# Patient Record
Sex: MALE | Race: WHITE | Employment: FULL TIME | URBAN - METROPOLITAN AREA
[De-identification: names, ages, dates, MRNs, and addresses within clinical notes are randomized per-mention and may not be internally consistent; named-entity substitution may affect disease eponyms.]

---

## 2017-03-24 PROBLEM — M25.512 PAIN IN LEFT ACROMIOCLAVICULAR JOINT: Status: ACTIVE | Noted: 2017-03-24

## 2017-03-24 PROBLEM — S42.292A OTHER CLOSED DISPLACED FRACTURE OF PROXIMAL END OF LEFT HUMERUS, INITIAL ENCOUNTER: Status: ACTIVE | Noted: 2017-03-24

## 2017-03-24 PROBLEM — S42.252A CLOSED DISPLACED FRACTURE OF GREATER TUBEROSITY OF LEFT HUMERUS, INITIAL ENCOUNTER: Status: ACTIVE | Noted: 2017-03-24

## 2017-06-16 PROBLEM — M75.02 ADHESIVE CAPSULITIS OF LEFT SHOULDER: Status: ACTIVE | Noted: 2017-06-16

## 2017-06-16 PROBLEM — S42.292S OTHER CLOSED DISPLACED FRACTURE OF PROXIMAL END OF LEFT HUMERUS, SEQUELA: Status: ACTIVE | Noted: 2017-06-16

## 2019-06-14 PROCEDURE — 87086 URINE CULTURE/COLONY COUNT: CPT | Performed by: PHYSICIAN ASSISTANT

## 2020-02-01 ENCOUNTER — HOSPITAL ENCOUNTER (EMERGENCY)
Age: 40
Discharge: HOME | End: 2020-02-01
Attending: EMERGENCY MEDICINE
Payer: COMMERCIAL

## 2020-02-01 VITALS
SYSTOLIC BLOOD PRESSURE: 173 MMHG | HEART RATE: 98 BPM | WEIGHT: 315 LBS | RESPIRATION RATE: 16 BRPM | BODY MASS INDEX: 49 KG/M2 | OXYGEN SATURATION: 99 % | TEMPERATURE: 98 F | DIASTOLIC BLOOD PRESSURE: 91 MMHG

## 2020-02-01 DIAGNOSIS — M54.16 LUMBAR RADICULOPATHY: Primary | ICD-10-CM

## 2020-02-01 PROCEDURE — 96372 THER/PROPH/DIAG INJ SC/IM: CPT

## 2020-02-01 PROCEDURE — 99283 EMERGENCY DEPT VISIT LOW MDM: CPT

## 2020-02-01 RX ORDER — HYDROCODONE BITARTRATE AND ACETAMINOPHEN 5; 325 MG/1; MG/1
1 TABLET ORAL EVERY 6 HOURS PRN
Qty: 6 TABLET | Refills: 0 | Status: SHIPPED | OUTPATIENT
Start: 2020-02-01 | End: 2021-04-13 | Stop reason: ALTCHOICE

## 2020-02-01 RX ORDER — PREDNISONE 20 MG/1
60 TABLET ORAL ONCE
Status: COMPLETED | OUTPATIENT
Start: 2020-02-01 | End: 2020-02-01

## 2020-02-01 RX ORDER — KETOROLAC TROMETHAMINE 30 MG/ML
60 INJECTION, SOLUTION INTRAMUSCULAR; INTRAVENOUS ONCE
Status: COMPLETED | OUTPATIENT
Start: 2020-02-01 | End: 2020-02-01

## 2020-02-01 RX ORDER — METHYLPREDNISOLONE 4 MG/1
TABLET ORAL
Qty: 1 PACKAGE | Refills: 0 | Status: SHIPPED | OUTPATIENT
Start: 2020-02-01 | End: 2020-02-07 | Stop reason: ALTCHOICE

## 2020-02-01 NOTE — ED PROVIDER NOTES
Patient Seen in: THE [facility] Emergency Department In [place]      History   Patient presents with:  Back Pain    Stated Complaint: back pain     HPI    This is a 36-year-old male with past medical history of morbid obesity, gout, hypertension, ADHD who pre Drug use: No             Review of Systems    Positive for stated complaint: back pain   Other systems are as noted in HPI. Constitutional and vital signs reviewed. All other systems reviewed and negative except as noted above.     Physical Exam     E visit. Follow-up:  spine    On 2/6/2020          Medications Prescribed:  Current Discharge Medication List    START taking these medications    HYDROcodone-acetaminophen 5-325 MG Oral Tab  Take 1 tablet by mouth every 6 (six) hours as needed for Pain.

## 2021-11-16 ENCOUNTER — APPOINTMENT (OUTPATIENT)
Dept: CT IMAGING | Age: 41
End: 2021-11-16
Attending: EMERGENCY MEDICINE
Payer: COMMERCIAL

## 2021-11-16 ENCOUNTER — HOSPITAL ENCOUNTER (EMERGENCY)
Age: 41
Discharge: HOME | End: 2021-11-16
Attending: EMERGENCY MEDICINE
Payer: COMMERCIAL

## 2021-11-16 VITALS
BODY MASS INDEX: 42.66 KG/M2 | HEIGHT: 72 IN | RESPIRATION RATE: 22 BRPM | HEART RATE: 82 BPM | OXYGEN SATURATION: 97 % | SYSTOLIC BLOOD PRESSURE: 151 MMHG | WEIGHT: 315 LBS | DIASTOLIC BLOOD PRESSURE: 68 MMHG

## 2021-11-16 DIAGNOSIS — N23 RENAL COLIC: Primary | ICD-10-CM

## 2021-11-16 PROCEDURE — 96374 THER/PROPH/DIAG INJ IV PUSH: CPT

## 2021-11-16 PROCEDURE — 99284 EMERGENCY DEPT VISIT MOD MDM: CPT

## 2021-11-16 PROCEDURE — 74176 CT ABD & PELVIS W/O CONTRAST: CPT | Performed by: EMERGENCY MEDICINE

## 2021-11-16 PROCEDURE — 81001 URINALYSIS AUTO W/SCOPE: CPT | Performed by: EMERGENCY MEDICINE

## 2021-11-16 PROCEDURE — 85025 COMPLETE CBC W/AUTO DIFF WBC: CPT | Performed by: EMERGENCY MEDICINE

## 2021-11-16 PROCEDURE — 96376 TX/PRO/DX INJ SAME DRUG ADON: CPT

## 2021-11-16 PROCEDURE — 96375 TX/PRO/DX INJ NEW DRUG ADDON: CPT

## 2021-11-16 PROCEDURE — 80053 COMPREHEN METABOLIC PANEL: CPT | Performed by: EMERGENCY MEDICINE

## 2021-11-16 PROCEDURE — 96361 HYDRATE IV INFUSION ADD-ON: CPT

## 2021-11-16 RX ORDER — TAMSULOSIN HYDROCHLORIDE 0.4 MG/1
0.4 CAPSULE ORAL DAILY
Qty: 7 CAPSULE | Refills: 0 | Status: SHIPPED | OUTPATIENT
Start: 2021-11-16 | End: 2021-11-16

## 2021-11-16 RX ORDER — TAMSULOSIN HYDROCHLORIDE 0.4 MG/1
0.4 CAPSULE ORAL DAILY
Qty: 7 CAPSULE | Refills: 0 | Status: SHIPPED | OUTPATIENT
Start: 2021-11-16 | End: 2021-11-23

## 2021-11-16 RX ORDER — ONDANSETRON 2 MG/ML
4 INJECTION INTRAMUSCULAR; INTRAVENOUS
Status: DISCONTINUED | OUTPATIENT
Start: 2021-11-16 | End: 2021-11-17

## 2021-11-16 RX ORDER — ONDANSETRON 8 MG/1
8 TABLET, ORALLY DISINTEGRATING ORAL EVERY 6 HOURS PRN
Qty: 10 TABLET | Refills: 0 | Status: SHIPPED | OUTPATIENT
Start: 2021-11-16 | End: 2021-11-16

## 2021-11-16 RX ORDER — KETOROLAC TROMETHAMINE 30 MG/ML
30 INJECTION, SOLUTION INTRAMUSCULAR; INTRAVENOUS ONCE
Status: COMPLETED | OUTPATIENT
Start: 2021-11-16 | End: 2021-11-16

## 2021-11-16 RX ORDER — ONDANSETRON 8 MG/1
8 TABLET, ORALLY DISINTEGRATING ORAL EVERY 6 HOURS PRN
Qty: 10 TABLET | Refills: 0 | Status: SHIPPED | OUTPATIENT
Start: 2021-11-16

## 2021-11-16 RX ORDER — HYDROCODONE BITARTRATE AND ACETAMINOPHEN 5; 325 MG/1; MG/1
1-2 TABLET ORAL EVERY 6 HOURS PRN
Qty: 20 TABLET | Refills: 0 | Status: SHIPPED | OUTPATIENT
Start: 2021-11-16 | End: 2021-11-16

## 2021-11-16 RX ORDER — HYDROCODONE BITARTRATE AND ACETAMINOPHEN 5; 325 MG/1; MG/1
1-2 TABLET ORAL EVERY 6 HOURS PRN
Qty: 20 TABLET | Refills: 0 | Status: SHIPPED | OUTPATIENT
Start: 2021-11-16 | End: 2021-11-26

## 2021-11-16 RX ORDER — HYDROMORPHONE HYDROCHLORIDE 1 MG/ML
0.5 INJECTION, SOLUTION INTRAMUSCULAR; INTRAVENOUS; SUBCUTANEOUS EVERY 30 MIN PRN
Status: DISCONTINUED | OUTPATIENT
Start: 2021-11-16 | End: 2021-11-17

## 2021-11-16 RX ORDER — HYDROMORPHONE HYDROCHLORIDE 1 MG/ML
INJECTION, SOLUTION INTRAMUSCULAR; INTRAVENOUS; SUBCUTANEOUS
Status: COMPLETED
Start: 2021-11-16 | End: 2021-11-16

## 2021-11-17 NOTE — ED INITIAL ASSESSMENT (HPI)
Pt to ed from home with l side abd pain, pt sx present last few hrs, pain radiates to groin area, denies injury or trauma

## 2021-11-17 NOTE — ED PROVIDER NOTES
Patient Seen in: [facility] Emergency Department In [place]      History   Patient presents with:  Abdomen/Flank Pain  Groin Pain    Stated Complaint: Left Flank pain with vomiting, left groin pain     Subjective:   HPI    Patient complains of left-sided with his eyes closed appears in great discomfort. Eyes: sclera white, conjunctiva pink and moist.  Lids and lashes are normal.  Lungs: Clear to auscultation bilaterally. No rhonchi or rales. Heart: Normal S1 and S2, without murmur or rub.   Distal pulses -----------         ------                     CBC W/ DIFFERENTIAL[[id number]]          Abnormal            Final result                 Please view results for these tests on the individual orders.                    MDM Dispersible  Take 1 tablet (8 mg total) by mouth every 6 (six) hours as needed for Nausea.   Qty: 10 tablet Refills: 0

## 2022-07-07 ENCOUNTER — OFFICE VISIT (OUTPATIENT)
Dept: INTERNAL MEDICINE CLINIC | Facility: CLINIC | Age: 42
End: 2022-07-07
Payer: COMMERCIAL

## 2022-07-07 VITALS
DIASTOLIC BLOOD PRESSURE: 72 MMHG | SYSTOLIC BLOOD PRESSURE: 124 MMHG | HEART RATE: 98 BPM | WEIGHT: 315 LBS | OXYGEN SATURATION: 97 % | HEIGHT: 73 IN | BODY MASS INDEX: 41.75 KG/M2 | RESPIRATION RATE: 18 BRPM | TEMPERATURE: 97 F

## 2022-07-07 DIAGNOSIS — R73.01 IFG (IMPAIRED FASTING GLUCOSE): ICD-10-CM

## 2022-07-07 DIAGNOSIS — I10 ESSENTIAL HYPERTENSION: ICD-10-CM

## 2022-07-07 DIAGNOSIS — M10.9 GOUTY ARTHROPATHY: ICD-10-CM

## 2022-07-07 DIAGNOSIS — F90.9 ATTENTION DEFICIT HYPERACTIVITY DISORDER (ADHD), UNSPECIFIED ADHD TYPE: ICD-10-CM

## 2022-07-07 DIAGNOSIS — Z00.00 ROUTINE GENERAL MEDICAL EXAMINATION AT A HEALTH CARE FACILITY: Primary | ICD-10-CM

## 2022-07-07 DIAGNOSIS — E66.01 CLASS 3 SEVERE OBESITY DUE TO EXCESS CALORIES WITHOUT SERIOUS COMORBIDITY WITH BODY MASS INDEX (BMI) OF 45.0 TO 49.9 IN ADULT (HCC): ICD-10-CM

## 2022-07-07 PROCEDURE — 3008F BODY MASS INDEX DOCD: CPT | Performed by: PHYSICIAN ASSISTANT

## 2022-07-07 PROCEDURE — 3074F SYST BP LT 130 MM HG: CPT | Performed by: PHYSICIAN ASSISTANT

## 2022-07-07 PROCEDURE — 99386 PREV VISIT NEW AGE 40-64: CPT | Performed by: PHYSICIAN ASSISTANT

## 2022-07-07 PROCEDURE — 3078F DIAST BP <80 MM HG: CPT | Performed by: PHYSICIAN ASSISTANT

## 2022-07-07 RX ORDER — DEXTROAMPHETAMINE SACCHARATE, AMPHETAMINE ASPARTATE, DEXTROAMPHETAMINE SULFATE AND AMPHETAMINE SULFATE 5; 5; 5; 5 MG/1; MG/1; MG/1; MG/1
20 TABLET ORAL 2 TIMES DAILY
Qty: 60 TABLET | Refills: 0 | Status: SHIPPED | OUTPATIENT
Start: 2022-09-07 | End: 2022-10-07

## 2022-07-07 RX ORDER — ALLOPURINOL 100 MG/1
100 TABLET ORAL 2 TIMES DAILY
Qty: 180 TABLET | Refills: 3 | Status: SHIPPED | OUTPATIENT
Start: 2022-07-07

## 2022-07-07 RX ORDER — HYDROCHLOROTHIAZIDE 25 MG/1
25 TABLET ORAL DAILY
Qty: 90 TABLET | Refills: 3 | Status: SHIPPED | OUTPATIENT
Start: 2022-07-07 | End: 2023-07-02

## 2022-07-07 RX ORDER — LOSARTAN POTASSIUM 100 MG/1
100 TABLET ORAL DAILY
Qty: 90 TABLET | Refills: 3 | Status: SHIPPED | OUTPATIENT
Start: 2022-07-07

## 2022-07-07 RX ORDER — DEXTROAMPHETAMINE SACCHARATE, AMPHETAMINE ASPARTATE, DEXTROAMPHETAMINE SULFATE AND AMPHETAMINE SULFATE 5; 5; 5; 5 MG/1; MG/1; MG/1; MG/1
20 TABLET ORAL 2 TIMES DAILY
Qty: 60 TABLET | Refills: 0 | Status: SHIPPED | OUTPATIENT
Start: 2022-08-07 | End: 2022-09-06

## 2022-07-07 RX ORDER — DEXTROAMPHETAMINE SACCHARATE, AMPHETAMINE ASPARTATE, DEXTROAMPHETAMINE SULFATE AND AMPHETAMINE SULFATE 5; 5; 5; 5 MG/1; MG/1; MG/1; MG/1
20 TABLET ORAL 2 TIMES DAILY
Qty: 60 TABLET | Refills: 0 | Status: SHIPPED | OUTPATIENT
Start: 2022-07-07 | End: 2022-08-06

## 2022-07-22 ENCOUNTER — TELEPHONE (OUTPATIENT)
Dept: INTERNAL MEDICINE CLINIC | Facility: CLINIC | Age: 42
End: 2022-07-22

## 2022-07-22 NOTE — TELEPHONE ENCOUNTER
PA submitted to [facility], awaiting response, form placed in CS folder

## 2022-07-28 ENCOUNTER — LAB ENCOUNTER (OUTPATIENT)
Dept: LAB | Age: 42
End: 2022-07-28
Attending: PHYSICIAN ASSISTANT
Payer: COMMERCIAL

## 2022-07-28 LAB
ALBUMIN SERPL-MCNC: 3.8 G/DL (ref 3.4–5)
ALBUMIN/GLOB SERPL: 1 {RATIO} (ref 1–2)
ALP LIVER SERPL-CCNC: 85 U/L
ALT SERPL-CCNC: 63 U/L
ANION GAP SERPL CALC-SCNC: 7 MMOL/L (ref 0–18)
AST SERPL-CCNC: 32 U/L (ref 15–37)
BASOPHILS # BLD AUTO: 0.04 X10(3) UL (ref 0–0.2)
BASOPHILS NFR BLD AUTO: 0.4 %
BILIRUB SERPL-MCNC: 0.9 MG/DL (ref 0.1–2)
BUN BLD-MCNC: 11 MG/DL (ref 7–18)
CALCIUM BLD-MCNC: 9 MG/DL (ref 8.5–10.1)
CHLORIDE SERPL-SCNC: 106 MMOL/L (ref 98–112)
CHOLEST SERPL-MCNC: 160 MG/DL (ref ?–200)
CO2 SERPL-SCNC: 27 MMOL/L (ref 21–32)
CREAT BLD-MCNC: 1.1 MG/DL
EOSINOPHIL # BLD AUTO: 0.1 X10(3) UL (ref 0–0.7)
EOSINOPHIL NFR BLD AUTO: 1.1 %
ERYTHROCYTE [DISTWIDTH] IN BLOOD BY AUTOMATED COUNT: 12.8 %
EST. AVERAGE GLUCOSE BLD GHB EST-MCNC: 131 MG/DL (ref 68–126)
FASTING PATIENT LIPID ANSWER: YES
FASTING STATUS PATIENT QL REPORTED: YES
GLOBULIN PLAS-MCNC: 3.9 G/DL (ref 2.8–4.4)
GLUCOSE BLD-MCNC: 131 MG/DL (ref 70–99)
HBA1C MFR BLD: 6.2 % (ref ?–5.7)
HCT VFR BLD AUTO: 47.5 %
HDLC SERPL-MCNC: 42 MG/DL (ref 40–59)
HGB BLD-MCNC: 15.9 G/DL
IMM GRANULOCYTES # BLD AUTO: 0.02 X10(3) UL (ref 0–1)
IMM GRANULOCYTES NFR BLD: 0.2 %
LDLC SERPL CALC-MCNC: 100 MG/DL (ref ?–100)
LYMPHOCYTES # BLD AUTO: 2.46 X10(3) UL (ref 1–4)
LYMPHOCYTES NFR BLD AUTO: 26.4 %
MCH RBC QN AUTO: 30.8 PG (ref 26–34)
MCHC RBC AUTO-ENTMCNC: 33.5 G/DL (ref 31–37)
MCV RBC AUTO: 91.9 FL
MONOCYTES # BLD AUTO: 0.82 X10(3) UL (ref 0.1–1)
MONOCYTES NFR BLD AUTO: 8.8 %
NEUTROPHILS # BLD AUTO: 5.89 X10 (3) UL (ref 1.5–7.7)
NEUTROPHILS # BLD AUTO: 5.89 X10(3) UL (ref 1.5–7.7)
NEUTROPHILS NFR BLD AUTO: 63.1 %
NONHDLC SERPL-MCNC: 118 MG/DL (ref ?–130)
OSMOLALITY SERPL CALC.SUM OF ELEC: 291 MOSM/KG (ref 275–295)
PLATELET # BLD AUTO: 195 10(3)UL (ref 150–450)
POTASSIUM SERPL-SCNC: 4 MMOL/L (ref 3.5–5.1)
PROT SERPL-MCNC: 7.7 G/DL (ref 6.4–8.2)
RBC # BLD AUTO: 5.17 X10(6)UL
SODIUM SERPL-SCNC: 140 MMOL/L (ref 136–145)
TRIGL SERPL-MCNC: 100 MG/DL (ref 30–149)
TSI SER-ACNC: 2.23 MIU/ML (ref 0.36–3.74)
VLDLC SERPL CALC-MCNC: 17 MG/DL (ref 0–30)
WBC # BLD AUTO: 9.3 X10(3) UL (ref 4–11)

## 2022-08-02 DIAGNOSIS — R79.89 ELEVATED LFTS: ICD-10-CM

## 2022-08-02 DIAGNOSIS — R73.03 PREDIABETES: Primary | ICD-10-CM

## 2022-08-25 ENCOUNTER — OFFICE VISIT (OUTPATIENT)
Dept: INTERNAL MEDICINE CLINIC | Facility: CLINIC | Age: 42
End: 2022-08-25
Payer: COMMERCIAL

## 2022-08-25 VITALS
HEIGHT: 73 IN | HEART RATE: 88 BPM | WEIGHT: 315 LBS | SYSTOLIC BLOOD PRESSURE: 142 MMHG | DIASTOLIC BLOOD PRESSURE: 78 MMHG | TEMPERATURE: 97 F | RESPIRATION RATE: 18 BRPM | BODY MASS INDEX: 41.75 KG/M2

## 2022-08-25 DIAGNOSIS — N50.812 PAIN IN BOTH TESTICLES: Primary | ICD-10-CM

## 2022-08-25 DIAGNOSIS — M54.41 ACUTE RIGHT-SIDED LOW BACK PAIN WITH RIGHT-SIDED SCIATICA: ICD-10-CM

## 2022-08-25 DIAGNOSIS — N50.811 PAIN IN BOTH TESTICLES: Primary | ICD-10-CM

## 2022-08-25 DIAGNOSIS — I10 ESSENTIAL HYPERTENSION: ICD-10-CM

## 2022-08-25 LAB
APPEARANCE: YELLOW
BILIRUBIN: NEGATIVE
GLUCOSE (URINE DIPSTICK): NEGATIVE MG/DL
KETONES (URINE DIPSTICK): NEGATIVE MG/DL
LEUKOCYTES: NEGATIVE
MULTISTIX LOT#: NORMAL NUMERIC
NITRITE, URINE: NEGATIVE
OCCULT BLOOD: NEGATIVE
PH, URINE: 6 (ref 4.5–8)
PROTEIN (URINE DIPSTICK): NEGATIVE MG/DL
SPECIFIC GRAVITY: 1.02 (ref 1–1.03)
URINE-COLOR: CLEAR
UROBILINOGEN,SEMI-QN: 1 MG/DL (ref 0–1.9)

## 2022-08-25 PROCEDURE — 3077F SYST BP >= 140 MM HG: CPT | Performed by: PHYSICIAN ASSISTANT

## 2022-08-25 PROCEDURE — 81003 URINALYSIS AUTO W/O SCOPE: CPT | Performed by: PHYSICIAN ASSISTANT

## 2022-08-25 PROCEDURE — 3008F BODY MASS INDEX DOCD: CPT | Performed by: PHYSICIAN ASSISTANT

## 2022-08-25 PROCEDURE — 99214 OFFICE O/P EST MOD 30 MIN: CPT | Performed by: PHYSICIAN ASSISTANT

## 2022-08-25 PROCEDURE — 3078F DIAST BP <80 MM HG: CPT | Performed by: PHYSICIAN ASSISTANT

## 2022-08-25 RX ORDER — METHYLPREDNISOLONE 4 MG/1
TABLET ORAL
Qty: 1 EACH | Refills: 0 | Status: SHIPPED | OUTPATIENT
Start: 2022-08-25

## 2022-08-25 RX ORDER — CYCLOBENZAPRINE HCL 10 MG
10 TABLET ORAL 3 TIMES DAILY PRN
Qty: 30 TABLET | Refills: 0 | Status: SHIPPED | OUTPATIENT
Start: 2022-08-25

## 2022-12-14 ENCOUNTER — APPOINTMENT (OUTPATIENT)
Dept: GENERAL RADIOLOGY | Age: 42
End: 2022-12-14
Attending: EMERGENCY MEDICINE
Payer: COMMERCIAL

## 2022-12-14 ENCOUNTER — HOSPITAL ENCOUNTER (EMERGENCY)
Age: 42
Discharge: HOME | End: 2022-12-14
Attending: EMERGENCY MEDICINE
Payer: COMMERCIAL

## 2022-12-14 VITALS
OXYGEN SATURATION: 98 % | DIASTOLIC BLOOD PRESSURE: 71 MMHG | WEIGHT: 315 LBS | TEMPERATURE: 98 F | RESPIRATION RATE: 20 BRPM | SYSTOLIC BLOOD PRESSURE: 128 MMHG | BODY MASS INDEX: 49 KG/M2 | HEART RATE: 92 BPM

## 2022-12-14 DIAGNOSIS — R00.2 PALPITATIONS: Primary | ICD-10-CM

## 2022-12-14 LAB
ALBUMIN SERPL-MCNC: 3.9 G/DL (ref 3.4–5)
ALBUMIN/GLOB SERPL: 1 {RATIO} (ref 1–2)
ALP LIVER SERPL-CCNC: 86 U/L
ALT SERPL-CCNC: 73 U/L
ANION GAP SERPL CALC-SCNC: 4 MMOL/L (ref 0–18)
AST SERPL-CCNC: 36 U/L (ref 15–37)
BASOPHILS # BLD AUTO: 0.03 X10(3) UL (ref 0–0.2)
BASOPHILS NFR BLD AUTO: 0.5 %
BILIRUB SERPL-MCNC: 0.5 MG/DL (ref 0.1–2)
BUN BLD-MCNC: 16 MG/DL (ref 7–18)
CALCIUM BLD-MCNC: 9.2 MG/DL (ref 8.5–10.1)
CHLORIDE SERPL-SCNC: 101 MMOL/L (ref 98–112)
CO2 SERPL-SCNC: 32 MMOL/L (ref 21–32)
CREAT BLD-MCNC: 1.11 MG/DL
D DIMER PPP FEU-MCNC: <0.27 UG/ML FEU (ref ?–0.5)
EOSINOPHIL # BLD AUTO: 0.07 X10(3) UL (ref 0–0.7)
EOSINOPHIL NFR BLD AUTO: 1.2 %
ERYTHROCYTE [DISTWIDTH] IN BLOOD BY AUTOMATED COUNT: 12.2 %
GFR SERPLBLD BASED ON 1.73 SQ M-ARVRAT: 86 ML/MIN/1.73M2 (ref 60–?)
GLOBULIN PLAS-MCNC: 4 G/DL (ref 2.8–4.4)
GLUCOSE BLD-MCNC: 91 MG/DL (ref 70–99)
HCT VFR BLD AUTO: 45.9 %
HGB BLD-MCNC: 15.8 G/DL
IMM GRANULOCYTES # BLD AUTO: 0.01 X10(3) UL (ref 0–1)
IMM GRANULOCYTES NFR BLD: 0.2 %
LYMPHOCYTES # BLD AUTO: 1.93 X10(3) UL (ref 1–4)
LYMPHOCYTES NFR BLD AUTO: 32.7 %
MAGNESIUM SERPL-MCNC: 2 MG/DL (ref 1.6–2.6)
MCH RBC QN AUTO: 31.1 PG (ref 26–34)
MCHC RBC AUTO-ENTMCNC: 34.4 G/DL (ref 31–37)
MCV RBC AUTO: 90.4 FL
MONOCYTES # BLD AUTO: 1.4 X10(3) UL (ref 0.1–1)
MONOCYTES NFR BLD AUTO: 23.7 %
NEUTROPHILS # BLD AUTO: 2.46 X10 (3) UL (ref 1.5–7.7)
NEUTROPHILS # BLD AUTO: 2.46 X10(3) UL (ref 1.5–7.7)
NEUTROPHILS NFR BLD AUTO: 41.7 %
OSMOLALITY SERPL CALC.SUM OF ELEC: 285 MOSM/KG (ref 275–295)
PLATELET # BLD AUTO: 185 10(3)UL (ref 150–450)
POTASSIUM SERPL-SCNC: 3.5 MMOL/L (ref 3.5–5.1)
PROT SERPL-MCNC: 7.9 G/DL (ref 6.4–8.2)
RBC # BLD AUTO: 5.08 X10(6)UL
SODIUM SERPL-SCNC: 137 MMOL/L (ref 136–145)
TROPONIN I HIGH SENSITIVITY: 5 NG/L
TSI SER-ACNC: 1.9 MIU/ML (ref 0.36–3.74)
WBC # BLD AUTO: 5.9 X10(3) UL (ref 4–11)

## 2022-12-14 PROCEDURE — 85379 FIBRIN DEGRADATION QUANT: CPT | Performed by: EMERGENCY MEDICINE

## 2022-12-14 PROCEDURE — 93005 ELECTROCARDIOGRAM TRACING: CPT

## 2022-12-14 PROCEDURE — 71045 X-RAY EXAM CHEST 1 VIEW: CPT | Performed by: EMERGENCY MEDICINE

## 2022-12-14 PROCEDURE — 85025 COMPLETE CBC W/AUTO DIFF WBC: CPT | Performed by: EMERGENCY MEDICINE

## 2022-12-14 PROCEDURE — 80053 COMPREHEN METABOLIC PANEL: CPT | Performed by: EMERGENCY MEDICINE

## 2022-12-14 PROCEDURE — 93010 ELECTROCARDIOGRAM REPORT: CPT

## 2022-12-14 PROCEDURE — 84443 ASSAY THYROID STIM HORMONE: CPT | Performed by: EMERGENCY MEDICINE

## 2022-12-14 PROCEDURE — 99285 EMERGENCY DEPT VISIT HI MDM: CPT

## 2022-12-14 PROCEDURE — 83735 ASSAY OF MAGNESIUM: CPT | Performed by: EMERGENCY MEDICINE

## 2022-12-14 PROCEDURE — 36415 COLL VENOUS BLD VENIPUNCTURE: CPT

## 2022-12-14 PROCEDURE — 99284 EMERGENCY DEPT VISIT MOD MDM: CPT

## 2022-12-14 PROCEDURE — 84484 ASSAY OF TROPONIN QUANT: CPT | Performed by: EMERGENCY MEDICINE

## 2022-12-15 LAB
ATRIAL RATE: 103 BPM
P AXIS: 53 DEGREES
P-R INTERVAL: 140 MS
Q-T INTERVAL: 342 MS
QRS DURATION: 76 MS
QTC CALCULATION (BEZET): 448 MS
R AXIS: 7 DEGREES
T AXIS: 45 DEGREES
VENTRICULAR RATE: 103 BPM

## 2022-12-15 NOTE — DISCHARGE INSTRUCTIONS
Minimize or eliminate caffeine, nicotine, alcohol, over-the-counter decongestants and antihistamines. Schedule outpatient Holter monitor. Return to the emergency department for new or worsening symptoms.

## 2023-01-04 ENCOUNTER — HOSPITAL ENCOUNTER (OUTPATIENT)
Dept: CV DIAGNOSTICS | Age: 43
Discharge: HOME | End: 2023-01-04
Attending: EMERGENCY MEDICINE
Payer: COMMERCIAL

## 2023-01-04 DIAGNOSIS — R00.2 PALPITATIONS: ICD-10-CM

## 2023-01-04 PROCEDURE — 93225 XTRNL ECG REC<48 HRS REC: CPT | Performed by: EMERGENCY MEDICINE

## 2023-01-04 PROCEDURE — 93227 XTRNL ECG REC<48 HR R&I: CPT | Performed by: EMERGENCY MEDICINE

## 2023-01-11 ENCOUNTER — HOSPITAL ENCOUNTER (OUTPATIENT)
Dept: ULTRASOUND IMAGING | Age: 43
Discharge: HOME | End: 2023-01-11
Attending: PHYSICIAN ASSISTANT
Payer: COMMERCIAL

## 2023-01-11 DIAGNOSIS — R79.89 ELEVATED LFTS: ICD-10-CM

## 2023-01-11 DIAGNOSIS — N50.811 PAIN IN BOTH TESTICLES: Primary | ICD-10-CM

## 2023-01-11 DIAGNOSIS — N50.811 PAIN IN BOTH TESTICLES: ICD-10-CM

## 2023-01-11 DIAGNOSIS — N50.812 PAIN IN BOTH TESTICLES: ICD-10-CM

## 2023-01-11 DIAGNOSIS — N50.812 PAIN IN BOTH TESTICLES: Primary | ICD-10-CM

## 2023-01-11 PROCEDURE — 93975 VASCULAR STUDY: CPT | Performed by: PHYSICIAN ASSISTANT

## 2023-01-11 PROCEDURE — 76700 US EXAM ABDOM COMPLETE: CPT | Performed by: PHYSICIAN ASSISTANT

## 2023-01-11 PROCEDURE — 76870 US EXAM SCROTUM: CPT | Performed by: PHYSICIAN ASSISTANT

## 2023-03-09 ENCOUNTER — TELEMEDICINE (OUTPATIENT)
Dept: INTERNAL MEDICINE CLINIC | Facility: CLINIC | Age: 43
End: 2023-03-09
Payer: COMMERCIAL

## 2023-03-09 DIAGNOSIS — R19.7 DIARRHEA, UNSPECIFIED TYPE: Primary | ICD-10-CM

## 2023-03-09 RX ORDER — DEXTROAMPHETAMINE SACCHARATE, AMPHETAMINE ASPARTATE, DEXTROAMPHETAMINE SULFATE AND AMPHETAMINE SULFATE 5; 5; 5; 5 MG/1; MG/1; MG/1; MG/1
20 TABLET ORAL 2 TIMES DAILY
Qty: 60 TABLET | Refills: 0 | Status: SHIPPED | OUTPATIENT
Start: 2023-03-09

## 2023-03-09 RX ORDER — ALLOPURINOL 100 MG/1
100 TABLET ORAL 2 TIMES DAILY
Qty: 60 TABLET | Refills: 0 | Status: SHIPPED | OUTPATIENT
Start: 2023-03-09

## 2023-03-09 RX ORDER — LOSARTAN POTASSIUM 100 MG/1
100 TABLET ORAL DAILY
Qty: 30 TABLET | Refills: 0 | Status: SHIPPED | OUTPATIENT
Start: 2023-03-09

## 2023-03-09 RX ORDER — HYDROCHLOROTHIAZIDE 25 MG/1
25 TABLET ORAL DAILY
Qty: 30 TABLET | Refills: 0 | Status: SHIPPED | OUTPATIENT
Start: 2023-03-09 | End: 2024-03-03

## 2023-03-10 ENCOUNTER — LAB ENCOUNTER (OUTPATIENT)
Dept: LAB | Age: 43
End: 2023-03-10
Attending: PHYSICIAN ASSISTANT
Payer: COMMERCIAL

## 2023-03-10 DIAGNOSIS — R19.7 DIARRHEA, UNSPECIFIED TYPE: ICD-10-CM

## 2023-03-10 PROCEDURE — 87427 SHIGA-LIKE TOXIN AG IA: CPT

## 2023-03-10 PROCEDURE — 87046 STOOL CULTR AEROBIC BACT EA: CPT

## 2023-03-10 PROCEDURE — 87493 C DIFF AMPLIFIED PROBE: CPT

## 2023-03-10 PROCEDURE — 87045 FECES CULTURE AEROBIC BACT: CPT

## 2023-03-11 LAB — C DIFF TOX B STL QL: NEGATIVE

## 2023-10-16 ENCOUNTER — TELEPHONE (OUTPATIENT)
Dept: INTERNAL MEDICINE CLINIC | Facility: CLINIC | Age: 43
End: 2023-10-16

## 2023-10-16 DIAGNOSIS — Z13.29 SCREENING FOR THYROID DISORDER: ICD-10-CM

## 2023-10-16 DIAGNOSIS — Z13.220 SCREENING FOR LIPID DISORDERS: ICD-10-CM

## 2023-10-16 DIAGNOSIS — Z13.228 SCREENING FOR METABOLIC DISORDER: ICD-10-CM

## 2023-10-16 DIAGNOSIS — Z00.00 ROUTINE GENERAL MEDICAL EXAMINATION AT A HEALTH CARE FACILITY: Primary | ICD-10-CM

## 2023-10-16 DIAGNOSIS — Z13.0 SCREENING FOR DISORDER OF BLOOD AND BLOOD-FORMING ORGANS: ICD-10-CM

## 2023-10-16 NOTE — TELEPHONE ENCOUNTER
Future Appointments   Date Time Provider Aroldo Vianey   11/16/2023 10:00 AM Lora Tai PA-C [facility]     Orders to quest- Pt informed that labs need to be completed no sooner than 2 weeks prior to the appt.  Pt aware to fast-no call back required

## 2023-11-14 ENCOUNTER — LAB ENCOUNTER (OUTPATIENT)
Dept: LAB | Age: 43
End: 2023-11-14
Attending: PHYSICIAN ASSISTANT
Payer: COMMERCIAL

## 2023-11-14 LAB
ALBUMIN SERPL-MCNC: 3.5 G/DL (ref 3.4–5)
ALBUMIN/GLOB SERPL: 1 {RATIO} (ref 1–2)
ALP LIVER SERPL-CCNC: 90 U/L
ALT SERPL-CCNC: 56 U/L
ANION GAP SERPL CALC-SCNC: 4 MMOL/L (ref 0–18)
AST SERPL-CCNC: 23 U/L (ref 15–37)
BASOPHILS # BLD AUTO: 0.04 X10(3) UL (ref 0–0.2)
BASOPHILS NFR BLD AUTO: 0.6 %
BILIRUB SERPL-MCNC: 0.5 MG/DL (ref 0.1–2)
BUN BLD-MCNC: 15 MG/DL (ref 9–23)
CALCIUM BLD-MCNC: 9.1 MG/DL (ref 8.5–10.1)
CHLORIDE SERPL-SCNC: 103 MMOL/L (ref 98–112)
CHOLEST SERPL-MCNC: 148 MG/DL (ref ?–200)
CO2 SERPL-SCNC: 31 MMOL/L (ref 21–32)
CREAT BLD-MCNC: 1.08 MG/DL
EGFRCR SERPLBLD CKD-EPI 2021: 88 ML/MIN/1.73M2 (ref 60–?)
EOSINOPHIL # BLD AUTO: 0.18 X10(3) UL (ref 0–0.7)
EOSINOPHIL NFR BLD AUTO: 2.6 %
ERYTHROCYTE [DISTWIDTH] IN BLOOD BY AUTOMATED COUNT: 12.5 %
FASTING PATIENT LIPID ANSWER: YES
FASTING STATUS PATIENT QL REPORTED: YES
GLOBULIN PLAS-MCNC: 3.6 G/DL (ref 2.8–4.4)
GLUCOSE BLD-MCNC: 192 MG/DL (ref 70–99)
HCT VFR BLD AUTO: 44.7 %
HDLC SERPL-MCNC: 36 MG/DL (ref 40–59)
HGB BLD-MCNC: 14.9 G/DL
IMM GRANULOCYTES # BLD AUTO: 0.01 X10(3) UL (ref 0–1)
IMM GRANULOCYTES NFR BLD: 0.1 %
LDLC SERPL CALC-MCNC: 81 MG/DL (ref ?–100)
LYMPHOCYTES # BLD AUTO: 2.64 X10(3) UL (ref 1–4)
LYMPHOCYTES NFR BLD AUTO: 38.2 %
MCH RBC QN AUTO: 31.4 PG (ref 26–34)
MCHC RBC AUTO-ENTMCNC: 33.3 G/DL (ref 31–37)
MCV RBC AUTO: 94.1 FL
MONOCYTES # BLD AUTO: 0.89 X10(3) UL (ref 0.1–1)
MONOCYTES NFR BLD AUTO: 12.9 %
NEUTROPHILS # BLD AUTO: 3.16 X10 (3) UL (ref 1.5–7.7)
NEUTROPHILS # BLD AUTO: 3.16 X10(3) UL (ref 1.5–7.7)
NEUTROPHILS NFR BLD AUTO: 45.6 %
NONHDLC SERPL-MCNC: 112 MG/DL (ref ?–130)
OSMOLALITY SERPL CALC.SUM OF ELEC: 292 MOSM/KG (ref 275–295)
PLATELET # BLD AUTO: 181 10(3)UL (ref 150–450)
POTASSIUM SERPL-SCNC: 4.1 MMOL/L (ref 3.5–5.1)
PROT SERPL-MCNC: 7.1 G/DL (ref 6.4–8.2)
RBC # BLD AUTO: 4.75 X10(6)UL
SODIUM SERPL-SCNC: 138 MMOL/L (ref 136–145)
T4 FREE SERPL-MCNC: 1.1 NG/DL (ref 0.8–1.7)
TRIGL SERPL-MCNC: 180 MG/DL (ref 30–149)
TSI SER-ACNC: 4.24 MIU/ML (ref 0.36–3.74)
VLDLC SERPL CALC-MCNC: 28 MG/DL (ref 0–30)
WBC # BLD AUTO: 6.9 X10(3) UL (ref 4–11)

## 2023-11-14 PROCEDURE — 3051F HG A1C>EQUAL 7.0%<8.0%: CPT | Performed by: PHYSICIAN ASSISTANT

## 2023-11-16 ENCOUNTER — OFFICE VISIT (OUTPATIENT)
Dept: INTERNAL MEDICINE CLINIC | Facility: CLINIC | Age: 43
End: 2023-11-16
Payer: COMMERCIAL

## 2023-11-16 ENCOUNTER — HOSPITAL ENCOUNTER (OUTPATIENT)
Dept: GENERAL RADIOLOGY | Age: 43
Discharge: HOME | End: 2023-11-16
Attending: PHYSICIAN ASSISTANT
Payer: COMMERCIAL

## 2023-11-16 VITALS
HEART RATE: 111 BPM | HEIGHT: 73 IN | TEMPERATURE: 98 F | SYSTOLIC BLOOD PRESSURE: 136 MMHG | DIASTOLIC BLOOD PRESSURE: 80 MMHG | WEIGHT: 315 LBS | RESPIRATION RATE: 18 BRPM | OXYGEN SATURATION: 98 % | BODY MASS INDEX: 41.75 KG/M2

## 2023-11-16 DIAGNOSIS — K21.9 GASTROESOPHAGEAL REFLUX DISEASE, UNSPECIFIED WHETHER ESOPHAGITIS PRESENT: ICD-10-CM

## 2023-11-16 DIAGNOSIS — N50.82 SCROTAL PAIN: ICD-10-CM

## 2023-11-16 DIAGNOSIS — R05.3 CHRONIC COUGH: ICD-10-CM

## 2023-11-16 DIAGNOSIS — E11.9 TYPE 2 DIABETES MELLITUS WITHOUT COMPLICATION, WITHOUT LONG-TERM CURRENT USE OF INSULIN (HCC): ICD-10-CM

## 2023-11-16 DIAGNOSIS — R04.0 EPISTAXIS: ICD-10-CM

## 2023-11-16 DIAGNOSIS — Z00.00 ROUTINE GENERAL MEDICAL EXAMINATION AT A HEALTH CARE FACILITY: Primary | ICD-10-CM

## 2023-11-16 DIAGNOSIS — E66.01 CLASS 3 SEVERE OBESITY DUE TO EXCESS CALORIES WITHOUT SERIOUS COMORBIDITY WITH BODY MASS INDEX (BMI) OF 50.0 TO 59.9 IN ADULT (HCC): ICD-10-CM

## 2023-11-16 DIAGNOSIS — M79.672 LEFT FOOT PAIN: ICD-10-CM

## 2023-11-16 DIAGNOSIS — R10.9 BILATERAL FLANK PAIN: ICD-10-CM

## 2023-11-16 DIAGNOSIS — R79.89 ELEVATED TSH: ICD-10-CM

## 2023-11-16 DIAGNOSIS — F90.9 ATTENTION DEFICIT HYPERACTIVITY DISORDER (ADHD), UNSPECIFIED ADHD TYPE: ICD-10-CM

## 2023-11-16 DIAGNOSIS — G47.33 OSA (OBSTRUCTIVE SLEEP APNEA): ICD-10-CM

## 2023-11-16 DIAGNOSIS — I10 ESSENTIAL HYPERTENSION: ICD-10-CM

## 2023-11-16 LAB
APPEARANCE: CLEAR
BILIRUBIN: NEGATIVE
GLUCOSE (URINE DIPSTICK): NEGATIVE MG/DL
KETONES (URINE DIPSTICK): NEGATIVE MG/DL
LEUKOCYTES: NEGATIVE
MULTISTIX EXPIRATION DATE: ABNORMAL DATE
MULTISTIX LOT#: ABNORMAL NUMERIC
NITRITE, URINE: NEGATIVE
OCCULT BLOOD: NEGATIVE
PH, URINE: 6 (ref 4.5–8)
PROTEIN (URINE DIPSTICK): 100 MG/DL
SPECIFIC GRAVITY: 1.03 (ref 1–1.03)
URINE-COLOR: YELLOW
UROBILINOGEN,SEMI-QN: 0.2 MG/DL (ref 0–1.9)

## 2023-11-16 PROCEDURE — 92229 IMG RTA DETC/MNTR DS POC ALY: CPT | Performed by: PHYSICIAN ASSISTANT

## 2023-11-16 PROCEDURE — 71046 X-RAY EXAM CHEST 2 VIEWS: CPT | Performed by: PHYSICIAN ASSISTANT

## 2023-11-16 PROCEDURE — 2033F EYE IMG VALID W/O RTNOPTHY: CPT | Performed by: PHYSICIAN ASSISTANT

## 2023-11-16 PROCEDURE — 73630 X-RAY EXAM OF FOOT: CPT | Performed by: PHYSICIAN ASSISTANT

## 2023-11-16 PROCEDURE — 81003 URINALYSIS AUTO W/O SCOPE: CPT | Performed by: PHYSICIAN ASSISTANT

## 2023-11-16 PROCEDURE — 99396 PREV VISIT EST AGE 40-64: CPT | Performed by: PHYSICIAN ASSISTANT

## 2023-11-16 PROCEDURE — 99214 OFFICE O/P EST MOD 30 MIN: CPT | Performed by: PHYSICIAN ASSISTANT

## 2023-11-16 PROCEDURE — 3075F SYST BP GE 130 - 139MM HG: CPT | Performed by: PHYSICIAN ASSISTANT

## 2023-11-16 PROCEDURE — 3079F DIAST BP 80-89 MM HG: CPT | Performed by: PHYSICIAN ASSISTANT

## 2023-11-16 PROCEDURE — 3008F BODY MASS INDEX DOCD: CPT | Performed by: PHYSICIAN ASSISTANT

## 2023-11-16 RX ORDER — SEMAGLUTIDE 0.68 MG/ML
0.25 INJECTION, SOLUTION SUBCUTANEOUS WEEKLY
Qty: 1 EACH | Refills: 0 | Status: SHIPPED | OUTPATIENT
Start: 2023-11-16

## 2023-11-16 RX ORDER — DEXTROAMPHETAMINE SACCHARATE, AMPHETAMINE ASPARTATE, DEXTROAMPHETAMINE SULFATE AND AMPHETAMINE SULFATE 5; 5; 5; 5 MG/1; MG/1; MG/1; MG/1
20 TABLET ORAL 2 TIMES DAILY
Qty: 60 TABLET | Refills: 0 | Status: SHIPPED | OUTPATIENT
Start: 2023-11-16

## 2023-11-16 RX ORDER — TRIAMCINOLONE ACETONIDE 1 MG/G
CREAM TOPICAL 2 TIMES DAILY PRN
Qty: 30 G | Refills: 0 | Status: SHIPPED | OUTPATIENT
Start: 2023-11-16

## 2023-11-16 RX ORDER — PEN NEEDLE, DIABETIC 32GX 5/32"
NEEDLE, DISPOSABLE MISCELLANEOUS
Qty: 30 EACH | Refills: 3 | Status: SHIPPED | OUTPATIENT
Start: 2023-11-16

## 2023-11-16 RX ORDER — NICOTINE POLACRILEX 4 MG/1
1 GUM, CHEWING ORAL
Qty: 90 TABLET | Refills: 1 | Status: SHIPPED | OUTPATIENT
Start: 2023-11-16 | End: 2023-12-16

## 2023-11-16 RX ORDER — ALLOPURINOL 100 MG/1
100 TABLET ORAL 2 TIMES DAILY
Qty: 180 TABLET | Refills: 3 | Status: SHIPPED | OUTPATIENT
Start: 2023-11-16

## 2023-11-16 RX ORDER — CYCLOBENZAPRINE HCL 10 MG
10 TABLET ORAL 3 TIMES DAILY PRN
Qty: 30 TABLET | Refills: 0 | Status: CANCELLED | OUTPATIENT
Start: 2023-11-16

## 2023-11-16 RX ORDER — LOSARTAN POTASSIUM 100 MG/1
100 TABLET ORAL DAILY
Qty: 90 TABLET | Refills: 3 | Status: SHIPPED | OUTPATIENT
Start: 2023-11-16

## 2023-11-16 RX ORDER — HYDROCHLOROTHIAZIDE 25 MG/1
25 TABLET ORAL DAILY
Qty: 90 TABLET | Refills: 3 | Status: SHIPPED | OUTPATIENT
Start: 2023-11-16 | End: 2024-11-10

## 2023-11-17 LAB
CREATININE, RANDOM URINE: 199 MG/DL (ref 20–320)
MICROALBUMIN/CREATININE RATIO, RANDOM URINE: 80 MCG/MG CREAT
MICROALBUMIN: 16 MG/DL

## 2023-11-18 ENCOUNTER — PATIENT MESSAGE (OUTPATIENT)
Dept: INTERNAL MEDICINE CLINIC | Facility: CLINIC | Age: 43
End: 2023-11-18

## 2023-11-21 NOTE — TELEPHONE ENCOUNTER
Patient calling is having difficulty getting his Ozempic from the pharmacy. Connie only has the 1mg and wondering if patient can increase the dose? Pt will also need PA for insurance to cover. Patient asking for callback to let him know what CS is going to do.     If she does increase the dose to 1mg can someone call [facility] to cancel the .25 mg?    Yaneth Hawkins [facility], [address] AT [facility], [phone], [phone]

## 2023-11-22 RX ORDER — METFORMIN HYDROCHLORIDE 500 MG/1
500 TABLET, EXTENDED RELEASE ORAL 2 TIMES DAILY WITH MEALS
Qty: 180 TABLET | Refills: 1 | Status: SHIPPED | OUTPATIENT
Start: 2023-11-22

## 2023-11-22 NOTE — TELEPHONE ENCOUNTER
Cannot jump right to 1 mg. If not in stock, then would just recommend starting metformin instead. Med sent.

## 2023-12-04 RX ORDER — SEMAGLUTIDE 0.68 MG/ML
0.25 INJECTION, SOLUTION SUBCUTANEOUS WEEKLY
Qty: 1 EACH | Refills: 0 | Status: SHIPPED | OUTPATIENT
Start: 2023-12-04

## 2023-12-08 ENCOUNTER — TELEPHONE (OUTPATIENT)
Dept: INTERNAL MEDICINE CLINIC | Facility: CLINIC | Age: 43
End: 2023-12-08

## 2023-12-15 ENCOUNTER — OFFICE VISIT (OUTPATIENT)
Dept: INTERNAL MEDICINE CLINIC | Facility: CLINIC | Age: 43
End: 2023-12-15
Payer: COMMERCIAL

## 2023-12-15 VITALS
TEMPERATURE: 98 F | HEIGHT: 72.44 IN | SYSTOLIC BLOOD PRESSURE: 138 MMHG | HEART RATE: 88 BPM | WEIGHT: 315 LBS | RESPIRATION RATE: 18 BRPM | BODY MASS INDEX: 42.2 KG/M2 | DIASTOLIC BLOOD PRESSURE: 78 MMHG | OXYGEN SATURATION: 98 %

## 2023-12-15 DIAGNOSIS — I10 ESSENTIAL HYPERTENSION: Primary | ICD-10-CM

## 2023-12-15 DIAGNOSIS — E66.01 CLASS 3 SEVERE OBESITY DUE TO EXCESS CALORIES WITHOUT SERIOUS COMORBIDITY WITH BODY MASS INDEX (BMI) OF 50.0 TO 59.9 IN ADULT (HCC): ICD-10-CM

## 2023-12-15 DIAGNOSIS — E11.9 TYPE 2 DIABETES MELLITUS WITHOUT COMPLICATION, WITHOUT LONG-TERM CURRENT USE OF INSULIN (HCC): ICD-10-CM

## 2023-12-15 RX ORDER — METFORMIN HYDROCHLORIDE 500 MG/1
500 TABLET, EXTENDED RELEASE ORAL
Qty: 180 TABLET | Refills: 1 | Status: SHIPPED | OUTPATIENT
Start: 2023-12-15

## 2023-12-15 RX ORDER — TIRZEPATIDE 2.5 MG/.5ML
2.5 INJECTION, SOLUTION SUBCUTANEOUS WEEKLY
Qty: 2 ML | Refills: 1 | Status: SHIPPED | OUTPATIENT
Start: 2023-12-15

## 2023-12-18 RX ORDER — DEXTROAMPHETAMINE SACCHARATE, AMPHETAMINE ASPARTATE, DEXTROAMPHETAMINE SULFATE AND AMPHETAMINE SULFATE 5; 5; 5; 5 MG/1; MG/1; MG/1; MG/1
20 TABLET ORAL 2 TIMES DAILY
Qty: 60 TABLET | Refills: 0 | Status: SHIPPED | OUTPATIENT
Start: 2023-12-18

## 2023-12-18 NOTE — TELEPHONE ENCOUNTER
Last VISIT - 12/15/23 f/u for HTN     Last CPE - 11/16/23     Last REFILL -     amphetamine-dextroamphetamine (ADDERALL) 20 MG Oral Tab 60 tablet 0 11/16/2023     Last LABS - 11/14/23 cbc, cmp, lipid, and tsh 11/16/23 microalbumin    No future appointments. Per PROTOCOL? None     Please Approve or Deny.

## 2024-02-19 DIAGNOSIS — F90.9 ATTENTION DEFICIT HYPERACTIVITY DISORDER (ADHD), UNSPECIFIED ADHD TYPE: ICD-10-CM

## 2024-02-19 RX ORDER — DEXTROAMPHETAMINE SACCHARATE, AMPHETAMINE ASPARTATE, DEXTROAMPHETAMINE SULFATE AND AMPHETAMINE SULFATE 5; 5; 5; 5 MG/1; MG/1; MG/1; MG/1
20 TABLET ORAL 2 TIMES DAILY
Qty: 60 TABLET | Refills: 0 | Status: SHIPPED | OUTPATIENT
Start: 2024-02-19

## 2024-02-19 NOTE — TELEPHONE ENCOUNTER
Last VISIT:12/15/2023 BENEDICTO CERVANTES    Last CPE:11/16/2023 BENEDICTO CERVANTES    Last REFILL:01/18/2024   Medication Quantity Refills Start End   amphetamine-dextroamphetamine (ADDERALL) 20 MG Oral Tab 60 tablet 0         Last LABS:11/14/2023     Future Appointments   Date Time Provider Department Center   3/1/2024 11:20 AM Maria M Garcia PA-C [facility]         Per PROTOCOL? Non Protocol    Approve or Deny

## 2024-03-01 ENCOUNTER — OFFICE VISIT (OUTPATIENT)
Dept: INTERNAL MEDICINE CLINIC | Facility: CLINIC | Age: 44
End: 2024-03-01
Payer: COMMERCIAL

## 2024-03-01 VITALS
BODY MASS INDEX: 42.2 KG/M2 | HEART RATE: 84 BPM | WEIGHT: 315 LBS | HEIGHT: 72.44 IN | OXYGEN SATURATION: 98 % | SYSTOLIC BLOOD PRESSURE: 126 MMHG | RESPIRATION RATE: 18 BRPM | TEMPERATURE: 97 F | DIASTOLIC BLOOD PRESSURE: 78 MMHG

## 2024-03-01 DIAGNOSIS — F90.9 ATTENTION DEFICIT HYPERACTIVITY DISORDER (ADHD), UNSPECIFIED ADHD TYPE: ICD-10-CM

## 2024-03-01 DIAGNOSIS — E55.9 VITAMIN D DEFICIENCY: ICD-10-CM

## 2024-03-01 DIAGNOSIS — E66.01 CLASS 3 SEVERE OBESITY DUE TO EXCESS CALORIES WITHOUT SERIOUS COMORBIDITY WITH BODY MASS INDEX (BMI) OF 50.0 TO 59.9 IN ADULT (HCC): ICD-10-CM

## 2024-03-01 DIAGNOSIS — R53.83 OTHER FATIGUE: ICD-10-CM

## 2024-03-01 DIAGNOSIS — I10 ESSENTIAL HYPERTENSION: ICD-10-CM

## 2024-03-01 DIAGNOSIS — E11.9 TYPE 2 DIABETES MELLITUS WITHOUT COMPLICATION, WITHOUT LONG-TERM CURRENT USE OF INSULIN (HCC): Primary | ICD-10-CM

## 2024-03-01 DIAGNOSIS — G47.33 OSA (OBSTRUCTIVE SLEEP APNEA): ICD-10-CM

## 2024-03-01 PROCEDURE — 3078F DIAST BP <80 MM HG: CPT | Performed by: PHYSICIAN ASSISTANT

## 2024-03-01 PROCEDURE — 3074F SYST BP LT 130 MM HG: CPT | Performed by: PHYSICIAN ASSISTANT

## 2024-03-01 PROCEDURE — 3008F BODY MASS INDEX DOCD: CPT | Performed by: PHYSICIAN ASSISTANT

## 2024-03-01 PROCEDURE — 99214 OFFICE O/P EST MOD 30 MIN: CPT | Performed by: PHYSICIAN ASSISTANT

## 2024-03-01 PROCEDURE — 90677 PCV20 VACCINE IM: CPT | Performed by: PHYSICIAN ASSISTANT

## 2024-03-01 PROCEDURE — 90471 IMMUNIZATION ADMIN: CPT | Performed by: PHYSICIAN ASSISTANT

## 2024-03-01 RX ORDER — TIRZEPATIDE 5 MG/.5ML
5 INJECTION, SOLUTION SUBCUTANEOUS WEEKLY
Qty: 6 ML | Refills: 1 | Status: SHIPPED | OUTPATIENT
Start: 2024-03-01

## 2024-03-01 NOTE — PROGRESS NOTES
Eleazar Das is a 43 year old male.   Chief Complaint   Patient presents with    Diabetes     EJ RM 11- Pt is here for a diabetes f/u     HPI:    Pt presents for f/u.     DM. Taking mounjaro 2.5 mg weekly and tolerating this well. A1c down from 7.7 to 6.6%. Weight is down 20 lbs.    Still with fatigue. Chronic issue. He was diagnosed with ELVIE in 2021 but was unable to tolerate cpap so no current treatment. Also with h/o vit D def and elevated TSH.     Allergies:  No Known Allergies   Current Meds:  Current Outpatient Medications   Medication Sig Dispense Refill    Tirzepatide (MOUNJARO) 5 MG/0.5ML Subcutaneous Solution Pen-injector Inject 5 mg into the skin once a week. 6 mL 1    amphetamine-dextroamphetamine (ADDERALL) 20 MG Oral Tab Take 1 tablet (20 mg total) by mouth 2 (two) times daily. 60 tablet 0    allopurinol 100 MG Oral Tab Take 1 tablet (100 mg total) by mouth 2 (two) times daily. 180 tablet 3    hydroCHLOROthiazide 25 MG Oral Tab Take 1 tablet (25 mg total) by mouth daily. 90 tablet 3    losartan 100 MG Oral Tab Take 1 tablet (100 mg total) by mouth daily. 90 tablet 3    metFORMIN  MG Oral Tablet 24 Hr Take 1 tablet (500 mg total) by mouth daily with breakfast. (Patient not taking: Reported on 3/1/2024) 180 tablet 1    Insulin Pen Needle (BD PEN NEEDLE LIBRA U/F) 32G X 4 MM Does not apply Misc Use with ozempic once weekly (Patient not taking: Reported on 12/15/2023) 30 each 3    triamcinolone 0.1 % External Cream Apply topically 2 (two) times daily as needed. (Patient not taking: Reported on 3/1/2024) 30 g 0    methylPREDNISolone (MEDROL) 4 MG Oral Tablet Therapy Pack As directed. (Patient not taking: Reported on 12/15/2023) 1 each 0    cyclobenzaprine 10 MG Oral Tab Take 1 tablet (10 mg total) by mouth 3 (three) times daily as needed for Muscle spasms. (Patient not taking: Reported on 12/15/2023) 30 tablet 0        PMH:     Past Medical History:   Diagnosis Date    ADHD (attention deficit  hyperactivity disorder)     Back pain     Gout     Hypogonadotropic hypogonadism (HCC)     Obesity, unspecified     Obstructive apnea 05/19/2021    DMG HST AHI 11    Unspecified essential hypertension        ROS:   GENERAL: Negative for fever, chills and fatigue. NAD.  HENT: Negative for congestion, sore throat, and ear pain.  RESPIRATORY: Negative for cough, chest tightness, shortness of breath and wheezing.    CV: Negative for chest pain, palpitations and leg swelling.   GI: Negative for nausea, vomiting, abdominal pain, diarrhea, and blood in stool.   : Negative for dysuria, hematuria and difficulty urinating.   MUSCULOSKELETAL: Negative for myalgias, back pain, joint swelling, arthralgias and gait problem.   NEURO: Negative for dizziness, syncope, weakness, numbness, tingling and headaches.   PSYCH: The patient is not nervous/anxious. No depression.      PHYSICAL EXAM:    /78   Pulse 84   Temp 96.8 °F (36 °C) (Temporal)   Resp 18   Ht 6' 0.44\" (1.84 m)   Wt (!) 374 lb 12.8 oz (170 kg)   SpO2 98%   BMI 50.22 kg/m²     GENERAL: NAD. A&Ox3  RESPIRATORY: CTAB, no R/R/W  CV: RRR, no murmurs.   PSYCH: Appropriate mood and affect.      ASSESSMENT/ PLAN:   1. Type 2 diabetes mellitus without complication, without long-term current use of insulin ([facility])  Improved and now at goal   Increase mounjaro to 5 mg weekly   Recheck labs and ov in 3 months   - Comp Metabolic Panel (14) [E]    2. Essential hypertension  Well controlled  CPM   Continue working on weight loss     3. Class 3 severe obesity due to excess calories without serious comorbidity with body mass index (BMI) of 50.0 to 59.9 in adult ([facility])  Weight is down 20 lbs  Increase mounjaro to 5 mg weekly   F/u in 3 months     4. Attention deficit hyperactivity disorder (ADHD), unspecified ADHD type  Stable with adderall prn   CPM    5. Vitamin D deficiency  Recheck labs   - Vitamin D [E]; Future    6. ELVIE (obstructive sleep apnea)  F/u with pulm   -  Pulmonary Referral - In Network    7. Other fatigue  F/u with pulm regarding ELVIE  Recheck vit D   Recheck TSH   Continue to work on DM control        Health Maintenance Due   Topic Date Due    Pneumococcal Vaccine: Birth to 65yrs (1 of 2 - PCV) Never done    Annual Depression Screening  01/01/2024           Pt indicates understanding and agrees to the plan.     Return in about 3 months (around 6/1/2024) for diabetes follow up.    Maria M Garcia PA-C

## 2024-03-02 ENCOUNTER — LAB ENCOUNTER (OUTPATIENT)
Dept: LAB | Age: 44
End: 2024-03-02
Attending: PHYSICIAN ASSISTANT
Payer: COMMERCIAL

## 2024-03-02 DIAGNOSIS — E55.9 VITAMIN D DEFICIENCY: ICD-10-CM

## 2024-03-02 LAB
ALBUMIN SERPL-MCNC: 3.8 G/DL (ref 3.4–5)
ALBUMIN/GLOB SERPL: 1 {RATIO} (ref 1–2)
ALP LIVER SERPL-CCNC: 92 U/L
ALT SERPL-CCNC: 69 U/L
ANION GAP SERPL CALC-SCNC: 6 MMOL/L (ref 0–18)
AST SERPL-CCNC: 27 U/L (ref 15–37)
BILIRUB SERPL-MCNC: 0.6 MG/DL (ref 0.1–2)
BUN BLD-MCNC: 13 MG/DL (ref 9–23)
CALCIUM BLD-MCNC: 10.1 MG/DL (ref 8.5–10.1)
CHLORIDE SERPL-SCNC: 105 MMOL/L (ref 98–112)
CO2 SERPL-SCNC: 28 MMOL/L (ref 21–32)
CREAT BLD-MCNC: 1.08 MG/DL
EGFRCR SERPLBLD CKD-EPI 2021: 88 ML/MIN/1.73M2 (ref 60–?)
FASTING STATUS PATIENT QL REPORTED: YES
GLOBULIN PLAS-MCNC: 3.9 G/DL (ref 2.8–4.4)
GLUCOSE BLD-MCNC: 119 MG/DL (ref 70–99)
OSMOLALITY SERPL CALC.SUM OF ELEC: 289 MOSM/KG (ref 275–295)
POTASSIUM SERPL-SCNC: 3.7 MMOL/L (ref 3.5–5.1)
PROT SERPL-MCNC: 7.7 G/DL (ref 6.4–8.2)
SODIUM SERPL-SCNC: 139 MMOL/L (ref 136–145)
TSI SER-ACNC: 2.25 MIU/ML (ref 0.36–3.74)
VIT D+METAB SERPL-MCNC: 52 NG/ML (ref 30–100)

## 2024-03-02 PROCEDURE — 82306 VITAMIN D 25 HYDROXY: CPT

## 2024-03-21 DIAGNOSIS — F90.9 ATTENTION DEFICIT HYPERACTIVITY DISORDER (ADHD), UNSPECIFIED ADHD TYPE: ICD-10-CM

## 2024-03-21 NOTE — TELEPHONE ENCOUNTER
Last VISIT:03/01/2024 BENEDICTO CERVANTES    Last CPE:11/16/2023 BENEDICTO CERVANTES    Last REFILL:02/19/2024   Medication Quantity Refills Start End   amphetamine-dextroamphetamine (ADDERALL) 20 MG Oral Tab 60 tablet 0         Last LABS:03/02/2024    No future appointments.      Per PROTOCOL? Non Protocol    Please Approve or Deny.

## 2024-03-23 RX ORDER — DEXTROAMPHETAMINE SACCHARATE, AMPHETAMINE ASPARTATE, DEXTROAMPHETAMINE SULFATE AND AMPHETAMINE SULFATE 5; 5; 5; 5 MG/1; MG/1; MG/1; MG/1
20 TABLET ORAL 2 TIMES DAILY
Qty: 60 TABLET | Refills: 0 | Status: SHIPPED | OUTPATIENT
Start: 2024-03-23

## 2024-04-21 DIAGNOSIS — F90.9 ATTENTION DEFICIT HYPERACTIVITY DISORDER (ADHD), UNSPECIFIED ADHD TYPE: ICD-10-CM

## 2024-04-22 NOTE — TELEPHONE ENCOUNTER
Requested Prescriptions     Pending Prescriptions Disp Refills    amphetamine-dextroamphetamine (ADDERALL) 20 MG Oral Tab 60 tablet 0     Sig: Take 1 tablet (20 mg total) by mouth 2 (two) times daily.       LOV: 3-1-2024-cs-diabetes    LAST CPE: 11--cs-physical    Last Labs: 3-2-2024-cmp,tsh    Last Refill: 3--60 tabs with 0 refills

## 2024-04-23 RX ORDER — DEXTROAMPHETAMINE SACCHARATE, AMPHETAMINE ASPARTATE, DEXTROAMPHETAMINE SULFATE AND AMPHETAMINE SULFATE 5; 5; 5; 5 MG/1; MG/1; MG/1; MG/1
20 TABLET ORAL 2 TIMES DAILY
Qty: 60 TABLET | Refills: 0 | Status: SHIPPED | OUTPATIENT
Start: 2024-04-23

## 2024-05-21 DIAGNOSIS — F90.9 ATTENTION DEFICIT HYPERACTIVITY DISORDER (ADHD), UNSPECIFIED ADHD TYPE: ICD-10-CM

## 2024-05-24 RX ORDER — DEXTROAMPHETAMINE SACCHARATE, AMPHETAMINE ASPARTATE, DEXTROAMPHETAMINE SULFATE AND AMPHETAMINE SULFATE 5; 5; 5; 5 MG/1; MG/1; MG/1; MG/1
20 TABLET ORAL 2 TIMES DAILY
Qty: 60 TABLET | Refills: 0 | Status: CANCELLED | OUTPATIENT
Start: 2024-05-24

## 2024-05-24 RX ORDER — DEXTROAMPHETAMINE SACCHARATE, AMPHETAMINE ASPARTATE, DEXTROAMPHETAMINE SULFATE AND AMPHETAMINE SULFATE 5; 5; 5; 5 MG/1; MG/1; MG/1; MG/1
20 TABLET ORAL 2 TIMES DAILY
Qty: 60 TABLET | Refills: 0 | Status: SHIPPED | OUTPATIENT
Start: 2024-06-24 | End: 2024-07-24

## 2024-05-24 RX ORDER — DEXTROAMPHETAMINE SACCHARATE, AMPHETAMINE ASPARTATE, DEXTROAMPHETAMINE SULFATE AND AMPHETAMINE SULFATE 5; 5; 5; 5 MG/1; MG/1; MG/1; MG/1
20 TABLET ORAL 2 TIMES DAILY
Qty: 60 TABLET | Refills: 0 | Status: SHIPPED | OUTPATIENT
Start: 2024-07-25 | End: 2024-08-24

## 2024-05-24 RX ORDER — DEXTROAMPHETAMINE SACCHARATE, AMPHETAMINE ASPARTATE, DEXTROAMPHETAMINE SULFATE AND AMPHETAMINE SULFATE 5; 5; 5; 5 MG/1; MG/1; MG/1; MG/1
20 TABLET ORAL 2 TIMES DAILY
Qty: 60 TABLET | Refills: 0 | Status: SHIPPED | OUTPATIENT
Start: 2024-05-24 | End: 2024-06-23

## 2024-05-24 NOTE — TELEPHONE ENCOUNTER
Please review; protocol failed/No Protocol    Recent Fills: 02/20/2024, 03/27/2024, 04/23/2024    Last Rx Written: 04/23/2024    Last Office Visit: 03/01/2024    Requested Prescriptions   Pending Prescriptions Disp Refills    amphetamine-dextroamphetamine (ADDERALL) 20 MG Oral Tab 60 tablet 0     Sig: Take 1 tablet (20 mg total) by mouth 2 (two) times daily.       Controlled Substance Medication Failed - 5/21/2024  4:02 PM        Failed - This medication is a controlled substance - forward to provider to refill             Recent Outpatient Visits              2 months ago Type 2 diabetes mellitus without complication, without long-term current use of insulin ([facility])    [facility], [address] [place], [place] [address]Kate Christine M., PA-C    Office Visit    5 months ago Essential hypertension    [address]    Office Visit    6 months ago Routine general medical examination at a health care facility    [facility] [address] [place], [place] [address]Kate Christine M., PA-C    Office Visit    1 year ago Diarrhea, unspecified type    [address]    Telemedicine    1 year ago Pain in both testicles    [facility] [address] [place], [place] [address]Kate Christine M., PA-C    Office Visit

## 2024-06-18 DIAGNOSIS — E11.9 TYPE 2 DIABETES MELLITUS WITHOUT COMPLICATION, WITHOUT LONG-TERM CURRENT USE OF INSULIN (HCC): Primary | ICD-10-CM

## 2024-06-18 NOTE — TELEPHONE ENCOUNTER
Eleazar Das requesting Medication Refill for:    Medication name and dose (copy and paste from medication list):   Medication Quantity Refills Start End   allopurinol 100 MG Oral Tab 180 tablet 3 11/16/2023 --   Sig:   Take 1 tablet (100 mg total) by mouth 2 (two) times daily.     Route:   Oral     Order #:   [id number]         If medication is not on medication list - transfer patient to RN queue for triage    Preferred Pharmacy:   [facility] - [place], [place] - [address] [place] [phone], [phone]   [address] [place] [address]   Phone: [phone] Fax: [phone]   Hours: Not open 24 hours       LOV: Visit date not found   Last Refill date: 11/16/2023  Next Scheduled appointment: No future appointments.    Eleazar Das requesting Medication Refill for:    Medication name and dose (copy and paste from medication list):   Medication Quantity Refills Start End   Tirzepatide (MOUNJARO) 5 MG/0.5ML Subcutaneous Solution Pen-injector 6 mL 1 3/1/2024 --   Sig:   Inject 5 mg into the skin once a week.     Route:   Subcutaneous     Order #:   [id number]         If medication is not on medication list - transfer patient to RN queue for triage    Preferred Pharmacy:   [facility] - [place], [place] - [address] [place] [phone], [phone]   [address] [place] [address]   Phone: [phone] Fax: [phone]   Hours: Not open 24 hours       LOV: Visit date not found   Last Refill date: 03/01/24  Next Scheduled appointment: No future appointments.    Eleazar Das requesting Medication Refill for:    Medication name and dose (copy and paste from medication list):   Medication Quantity Refills Start End   hydroCHLOROthiazide 25 MG Oral Tab 90 tablet 3 11/16/2023 11/10/2024   Sig:   Take 1 tablet (25 mg total) by mouth daily.     Route:   Oral     Order #:   [id number]         If medication is not on medication list - transfer patient  to RN queue for triage    Preferred Pharmacy:   [facility] - [place], [place] - [address] [facility] [phone], [phone] [address] [facility] [address]   Phone: [phone] Fax: [phone]   Hours: Not open 24 hours       LOV: Visit date not found   Last Refill date: 11/16/23  Next Scheduled appointment: No future appointments.

## 2024-06-18 NOTE — TELEPHONE ENCOUNTER
allopurinol 100 MG  Routing through protocols     Tirzepatide (MOUNJARO) 5 MG/0.5ML Subcutaneous Solution Pen-injector Routing through protocols       hydroCHLOROthiazide 25 MG Routing through protocols

## 2024-06-19 NOTE — TELEPHONE ENCOUNTER
[facility] calling for refill of below medication for patient.                  losartan 100 MG Oral Tab 90 tablet 3 11/16/2023 --    Sig - Route: Take 1 tablet (100 mg total) by mouth daily. - Oral    Sent to pharmacy as: Losartan Potassium 100 MG Oral Tablet (Cozaar)    E-Prescribing Status: Receipt confirmed by pharmacy (11/16/2023 11:00 AM CST)

## 2024-06-20 RX ORDER — HYDROCHLOROTHIAZIDE 25 MG/1
25 TABLET ORAL DAILY
Qty: 90 TABLET | Refills: 3 | Status: SHIPPED | OUTPATIENT
Start: 2024-06-20 | End: 2025-06-15

## 2024-06-20 NOTE — TELEPHONE ENCOUNTER
Please review.  Protocol failed / Has no protocol.     Requested Prescriptions   Pending Prescriptions Disp Refills    Tirzepatide (MOUNJARO) 5 MG/0.5ML Subcutaneous Solution Pen-injector 6 mL 1     Sig: Inject 5 mg into the skin once a week.       Diabetes Medication Protocol Failed - 6/18/2024  9:49 AM        Failed - Last A1C < 7.5 and within past 6 months     Lab Results   Component Value Date    A1C 7.7 (H) 11/14/2023             Passed - In person appointment or virtual visit in the past 6 mos or appointment in next 3 mos     Recent Outpatient Visits              3 months ago Type 2 diabetes mellitus without complication, without long-term current use of insulin ([facility])    [facility], [address] [place], [place] [address]Kate Christine M., PA-C    Office Visit    6 months ago Essential hypertension    [facility], [address] [place], [place] [address]Kate Christine M., PA-C    Office Visit    7 months ago Routine general medical examination at a health care facility    [facility], [address] [place], [place] [address]Kate Christine M., PA-C    Office Visit    1 year ago Diarrhea, unspecified type    [facility], [address] [place], [place] [address]Kate Christine M., PA-C    Telemedicine    1 year ago Pain in both testicles    [facility], [address] [place], [place] [address]Kate Christine M., PA-C    Office Visit                      Passed - Microalbumin procedure in past 12 months or taking ACE/ARB        Passed - EGFRCR or GFRNAA > 50     GFR Evaluation  EGFRCR: 88 , resulted on 3/2/2024          Passed - GFR in the past 12 months          hydroCHLOROthiazide 25 MG Oral Tab 90 tablet 3     Sig: Take 1 tablet (25 mg total) by mouth daily.       Hypertension Medications Protocol Passed - 6/18/2024  9:49 AM        Passed - CMP or BMP in past 12 months        Passed - Last BP reading less than 140/90     BP Readings from Last 1 Encounters:   03/01/24 126/78                Passed - In person appointment or virtual visit in the past 12 mos or appointment in next 3 mos     Recent Outpatient Visits              3 months ago Type 2 diabetes mellitus without complication, without long-term current use of insulin ([facility])    [address]    Office Visit    6 months ago Essential hypertension    [address] Maria M Wei, BENEDICTO    Office Visit    7 months ago Routine general medical examination at a health care facility    [address] BENEDICTO    Office Visit    1 year ago Diarrhea, unspecified type    [address] BENEDICTO    Telemedicine    1 year ago Pain in both testicles    [address]    Office Visit                      Passed - EGFRCR or GFRNAA > 50     GFR Evaluation  EGFRCR: 88 , resulted on 3/2/2024            allopurinol 100 MG Oral Tab 180 tablet 3     Sig: Take 1 tablet (100 mg total) by mouth 2 (two) times daily.       There is no refill protocol information for this order          Recent Outpatient Visits              3 months ago Type 2 diabetes mellitus without complication, without long-term current use of insulin ([facility])    [address]    Office Visit    6 months ago Essential hypertension    [address]-C    Office Visit    7 months ago Routine general medical examination at a health care facility    [address]-C    Office Visit    1 year ago Diarrhea, unspecified type    [address]    Telemedicine     1 year ago Pain in both testicles    [facility], [address], [place] PA[place]    Office Visit

## 2024-06-21 RX ORDER — LOSARTAN POTASSIUM 100 MG/1
100 TABLET ORAL DAILY
Qty: 90 TABLET | Refills: 3 | Status: SHIPPED | OUTPATIENT
Start: 2024-06-21

## 2024-06-21 NOTE — TELEPHONE ENCOUNTER
Refill Per Protocol     Requested Prescriptions   Pending Prescriptions Disp Refills    losartan 100 MG Oral Tab 90 tablet 3     Sig: Take 1 tablet (100 mg total) by mouth daily.       Hypertension Medications Protocol Passed - 6/19/2024 12:19 PM        Passed - CMP or BMP in past 12 months        Passed - Last BP reading less than 140/90     BP Readings from Last 1 Encounters:   03/01/24 126/78               Passed - In person appointment or virtual visit in the past 12 mos or appointment in next 3 mos     Recent Outpatient Visits              3 months ago Type 2 diabetes mellitus without complication, without long-term current use of insulin ([facility])    [facility], [address] [place], [place] [address]Kate Christine M., PA-C    Office Visit    6 months ago Essential hypertension    [address]    Office Visit    7 months ago Routine general medical examination at a health care facility    [facility] [address] [place], [place] [address]Kate Christine M., PA-C    Office Visit    1 year ago Diarrhea, unspecified type    [address]-C    Telemedicine    1 year ago Pain in both testicles    [address]    Office Visit                      Passed - EGFRCR or GFRNAA > 50     GFR Evaluation  EGFRCR: 88 , resulted on 3/2/2024                   Recent Outpatient Visits              3 months ago Type 2 diabetes mellitus without complication, without long-term current use of insulin ([facility])    [address]    Office Visit    6 months ago Essential hypertension    [address]    Office Visit    7 months ago Routine general medical examination at a health care facility     [facility], [address] [place] [address] Maria M Wei PA-C    Office Visit    1 year ago Diarrhea, unspecified type    [facility], [address] [place] [address] Maria M Wei PA-C    Telemedicine    1 year ago Pain in both testicles    [facility], [address] [place] [address] Maria M Wei PA-C    Office Visit

## 2024-06-22 RX ORDER — ALLOPURINOL 100 MG/1
100 TABLET ORAL 2 TIMES DAILY
Qty: 180 TABLET | Refills: 3 | Status: SHIPPED | OUTPATIENT
Start: 2024-06-22

## 2024-06-22 RX ORDER — TIRZEPATIDE 5 MG/.5ML
5 INJECTION, SOLUTION SUBCUTANEOUS WEEKLY
Qty: 2 ML | Refills: 0 | Status: SHIPPED | OUTPATIENT
Start: 2024-06-22 | End: 2024-09-20

## 2024-06-22 NOTE — TELEPHONE ENCOUNTER
Refilled temporarily.   No future appointments.    Due for DM f/u and labs. Lab orders placed. Please advise to schedule and complete labs prior. Appt should be 40 minutes.

## 2024-09-20 ENCOUNTER — HOSPITAL ENCOUNTER (OUTPATIENT)
Dept: GENERAL RADIOLOGY | Age: 44
Discharge: HOME | End: 2024-09-20
Attending: PHYSICIAN ASSISTANT
Payer: COMMERCIAL

## 2024-09-20 ENCOUNTER — OFFICE VISIT (OUTPATIENT)
Dept: INTERNAL MEDICINE CLINIC | Facility: CLINIC | Age: 44
End: 2024-09-20
Payer: COMMERCIAL

## 2024-09-20 ENCOUNTER — LAB ENCOUNTER (OUTPATIENT)
Dept: LAB | Age: 44
End: 2024-09-20
Attending: PHYSICIAN ASSISTANT
Payer: COMMERCIAL

## 2024-09-20 VITALS
WEIGHT: 315 LBS | SYSTOLIC BLOOD PRESSURE: 120 MMHG | HEART RATE: 81 BPM | DIASTOLIC BLOOD PRESSURE: 80 MMHG | BODY MASS INDEX: 49 KG/M2 | OXYGEN SATURATION: 98 % | TEMPERATURE: 99 F

## 2024-09-20 DIAGNOSIS — K21.9 GASTROESOPHAGEAL REFLUX DISEASE WITHOUT ESOPHAGITIS: ICD-10-CM

## 2024-09-20 DIAGNOSIS — M25.512 ACUTE PAIN OF LEFT SHOULDER: ICD-10-CM

## 2024-09-20 DIAGNOSIS — M25.512 ACUTE PAIN OF LEFT SHOULDER: Primary | ICD-10-CM

## 2024-09-20 DIAGNOSIS — M25.572 ACUTE BILATERAL ANKLE PAIN: ICD-10-CM

## 2024-09-20 DIAGNOSIS — M25.571 ACUTE BILATERAL ANKLE PAIN: ICD-10-CM

## 2024-09-20 DIAGNOSIS — E11.9 TYPE 2 DIABETES MELLITUS WITHOUT COMPLICATION, WITHOUT LONG-TERM CURRENT USE OF INSULIN (HCC): ICD-10-CM

## 2024-09-20 DIAGNOSIS — M77.12 LEFT LATERAL EPICONDYLITIS: ICD-10-CM

## 2024-09-20 DIAGNOSIS — E66.01 CLASS 3 SEVERE OBESITY DUE TO EXCESS CALORIES WITH SERIOUS COMORBIDITY AND BODY MASS INDEX (BMI) OF 45.0 TO 49.9 IN ADULT (HCC): ICD-10-CM

## 2024-09-20 DIAGNOSIS — G47.10 HYPERSOMNOLENCE: ICD-10-CM

## 2024-09-20 DIAGNOSIS — M54.2 NECK PAIN: ICD-10-CM

## 2024-09-20 LAB
ALBUMIN SERPL-MCNC: 4.5 G/DL (ref 3.2–4.8)
ALBUMIN/GLOB SERPL: 1.5 {RATIO} (ref 1–2)
ALP LIVER SERPL-CCNC: 84 U/L
ALT SERPL-CCNC: 35 U/L
ANION GAP SERPL CALC-SCNC: 7 MMOL/L (ref 0–18)
AST SERPL-CCNC: 28 U/L (ref ?–34)
BILIRUB SERPL-MCNC: 0.6 MG/DL (ref 0.3–1.2)
BUN BLD-MCNC: 15 MG/DL (ref 9–23)
CALCIUM BLD-MCNC: 9.8 MG/DL (ref 8.7–10.4)
CHLORIDE SERPL-SCNC: 105 MMOL/L (ref 98–112)
CO2 SERPL-SCNC: 30 MMOL/L (ref 21–32)
CREAT BLD-MCNC: 1.04 MG/DL
EGFRCR SERPLBLD CKD-EPI 2021: 91 ML/MIN/1.73M2 (ref 60–?)
EST. AVERAGE GLUCOSE BLD GHB EST-MCNC: 114 MG/DL (ref 68–126)
FASTING STATUS PATIENT QL REPORTED: YES
GLOBULIN PLAS-MCNC: 3 G/DL (ref 2–3.5)
GLUCOSE BLD-MCNC: 104 MG/DL (ref 70–99)
HBA1C MFR BLD: 5.6 % (ref ?–5.7)
OSMOLALITY SERPL CALC.SUM OF ELEC: 295 MOSM/KG (ref 275–295)
POTASSIUM SERPL-SCNC: 4 MMOL/L (ref 3.5–5.1)
PROT SERPL-MCNC: 7.5 G/DL (ref 5.7–8.2)
SODIUM SERPL-SCNC: 142 MMOL/L (ref 136–145)

## 2024-09-20 PROCEDURE — 3079F DIAST BP 80-89 MM HG: CPT | Performed by: PHYSICIAN ASSISTANT

## 2024-09-20 PROCEDURE — G2211 COMPLEX E/M VISIT ADD ON: HCPCS | Performed by: PHYSICIAN ASSISTANT

## 2024-09-20 PROCEDURE — 73610 X-RAY EXAM OF ANKLE: CPT | Performed by: PHYSICIAN ASSISTANT

## 2024-09-20 PROCEDURE — 3074F SYST BP LT 130 MM HG: CPT | Performed by: PHYSICIAN ASSISTANT

## 2024-09-20 PROCEDURE — 73030 X-RAY EXAM OF SHOULDER: CPT | Performed by: PHYSICIAN ASSISTANT

## 2024-09-20 PROCEDURE — 99215 OFFICE O/P EST HI 40 MIN: CPT | Performed by: PHYSICIAN ASSISTANT

## 2024-09-20 RX ORDER — TIRZEPATIDE 7.5 MG/.5ML
7.5 INJECTION, SOLUTION SUBCUTANEOUS WEEKLY
Qty: 6 ML | Refills: 1 | Status: SHIPPED | OUTPATIENT
Start: 2024-09-20

## 2024-09-20 RX ORDER — PANTOPRAZOLE SODIUM 20 MG/1
20 TABLET, DELAYED RELEASE ORAL
Qty: 90 TABLET | Refills: 1 | Status: SHIPPED | OUTPATIENT
Start: 2024-09-20

## 2024-09-20 NOTE — PROGRESS NOTES
Eleazar Das is a 44 year old male.   Chief Complaint   Patient presents with    Diabetes    Follow - Up     HPI:    Pt presents for f/u.     DM.   Completed labs this morning and still pending.   Currently taking mounjaro 5 mg weekly    Weight. Down 33 lbs since starting mounjaro. Overall tolerating med well but starting to have increased appetite again and would like to increase dose if possible.     Bilateral achilles ankle pain. Denies injury. Feels a bump along posterior aspect of both ankles. Pain with walking 2 blocks from train to bus during his commute. Has tried switching from dress shoes to gym shoes with no change in pain.     Left shoulder stiffness and pain. H/o shoulder fracture in 2017. Treated conservatively with PT and a sling. Pain and ROM improved but now noticing stiffness and decreased ROM. No recent injury.     Neck pain and stiffness.  Denies radiation of pain.   Denies numbness, tingling, and weakness.   Poor posture.     Left elbow pain. Right hand dominant. Cannot identify any repetitive movements. Denies radiation of pain.     Fatigue. Chronic issue. Has fallen asleep on train ride home and missed his train stop due to fatigue.   He has known ELVIE but has been unable to tolerate cpap. He is hopeful that weight loss will help with ELVIE symptoms.   He takes adderall 20 mg tablets - this is prescribed for bid but typically only takes once daily.     GERD. Chronic intermittent issue. Requests refill of PPI.     Allergies:  No Known Allergies   Current Meds:  Current Outpatient Medications   Medication Sig Dispense Refill    pantoprazole (PROTONIX) 20 MG Oral Tab EC Take 1 tablet (20 mg total) by mouth every morning before breakfast. 90 tablet 1    Tirzepatide (MOUNJARO) 7.5 MG/0.5ML Subcutaneous Solution Pen-injector Inject 7.5 mg into the skin once a week. 6 mL 1    allopurinol 100 MG Oral Tab Take 1 tablet (100 mg total) by mouth 2 (two) times daily. 180 tablet 3    losartan 100 MG Oral  Tab Take 1 tablet (100 mg total) by mouth daily. 90 tablet 3    hydroCHLOROthiazide 25 MG Oral Tab Take 1 tablet (25 mg total) by mouth daily. 90 tablet 3    amphetamine-dextroamphetamine (ADDERALL) 20 MG Oral Tab Take 1 tablet (20 mg total) by mouth 2 (two) times daily. 60 tablet 0        PMH:     Past Medical History:    ADHD (attention deficit hyperactivity disorder)    Back pain    Gout    Hypogonadotropic hypogonadism (HCC)    Obesity, unspecified    Obstructive apnea    DMG HST AHI 11    Unspecified essential hypertension       ROS:   Review of Systems   Constitutional:  Positive for fatigue. Negative for chills and fever.   HENT:  Negative for congestion, rhinorrhea, sinus pressure, sinus pain and sore throat.    Respiratory:  Negative for cough, shortness of breath and wheezing.    Cardiovascular:  Negative for chest pain, palpitations and leg swelling.   Gastrointestinal:  Negative for abdominal pain, blood in stool, constipation, diarrhea and vomiting.   Genitourinary: Negative.    Musculoskeletal:  Positive for arthralgias and neck pain.   Neurological:  Negative for dizziness, light-headedness and headaches.         PHYSICAL EXAM:    /80   Pulse 81   Temp 98.7 °F (37.1 °C)   Wt (!) 363 lb (164.7 kg)   SpO2 98%   BMI 48.64 kg/m²     GENERAL: NAD. A&Ox3  HEENT: Ear canals clear, TMs pearly gray bilaterally. Throat without erythema or exudates.  NECK: No LAD. Full ROM.   RESPIRATORY: CTAB, no R/R/W  CV: RRR, no murmurs.   LE: bilateral posterior ankle tenderness along achilles tendons with palpable masses towards distal aspect of both achilles tendons  UE: left lateral epicondyle tenderness; left shoulder movements restricted due to pain   PSYCH: Appropriate mood and affect.      ASSESSMENT/ PLAN:   1. Acute pain of left shoulder  Check xray due to h/o fracture   Suspect frozen shoulder and would likely benefit from PT   - XR SHOULDER, COMPLETE (MIN 2 VIEWS), LEFT (CPT=73030); Future    2. Acute  bilateral ankle pain  Calcified tendonitis?   Check xrays   Ibuprofen prn   Would likely benefit from PT   - XR ANKLE (MIN 3 VIEWS), LEFT (CPT=73610); Future  - XR ANKLE (MIN 3 VIEWS), RIGHT (CPT=73610); Future    3. Left lateral epicondylitis  Ice prn   Tennis elbow brace     4. Neck pain  Seems muscular   Heat prn   Work on proper posture     5. Type 2 diabetes mellitus without complication, without long-term current use of insulin ([facility])  Will await lab results     6. Class 3 severe obesity due to excess calories with serious comorbidity and body mass index (BMI) of 45.0 to 49.9 in adult ([facility])  Increase mounjaro to 7.5 mg weekly   F/u in 3 months     7. Gastroesophageal reflux disease without esophagitis  Protonix daily prn     8. Hypersomnolence  Try taking adderall bid to see if this helps with afternoon fatigue   F/u with pulm if persists        Health Maintenance Due   Topic Date Due    Diabetes Care A1C  05/14/2024    Diabetes Care Dilated Eye Exam  11/16/2024    Annual Physical  11/16/2024           Pt indicates understanding and agrees to the plan.     Return in about 3 months (around 12/20/2024) for diabetes follow up.    Maria M Garcia PA-C

## 2024-09-30 ENCOUNTER — TELEPHONE (OUTPATIENT)
Dept: INTERNAL MEDICINE CLINIC | Facility: CLINIC | Age: 44
End: 2024-09-30

## 2024-09-30 NOTE — TELEPHONE ENCOUNTER
Dr. Sanket Corona MD office faxed over an achieving glycemic control in people with diabetes form to be signed and faxed back to the number [phone]. Placed in CS bin to be signed.

## 2024-11-01 DIAGNOSIS — F90.9 ATTENTION DEFICIT HYPERACTIVITY DISORDER (ADHD), UNSPECIFIED ADHD TYPE: ICD-10-CM

## 2024-11-05 RX ORDER — DEXTROAMPHETAMINE SACCHARATE, AMPHETAMINE ASPARTATE, DEXTROAMPHETAMINE SULFATE AND AMPHETAMINE SULFATE 5; 5; 5; 5 MG/1; MG/1; MG/1; MG/1
20 TABLET ORAL 2 TIMES DAILY
Qty: 60 TABLET | Refills: 0 | Status: SHIPPED | OUTPATIENT
Start: 2024-11-05

## 2024-11-05 NOTE — TELEPHONE ENCOUNTER
Please review; protocol failed/ has no protocol      Due to national backorder we cannot send multiple prescriptions for  controlled medications at a time, only month supply is advised.    Recent fills: 06/23/2024,05/24/2024  Last Rx written: 05/24/2024  Last Office Visit: 09/20/2024    Recent Visits  Date Type Provider Dept   09/20/24 Office Visit Maria M Garcia PA-C [facility] [address]     Date Type Provider Dept   12/20/24 Appointment Maria M Garcia PA-C [facility] [address]   Showing future appointments within next 150 days with a meds authorizing provider and meeting all other requirements      Requested Prescriptions   Pending Prescriptions Disp Refills    amphetamine-dextroamphetamine (ADDERALL) 20 MG Oral Tab 60 tablet 0     Sig: Take 1 tablet (20 mg total) by mouth 2 (two) times daily.       Controlled Substance Medication Failed - 11/5/2024  3:21 PM        Failed - This medication is a controlled substance - forward to provider to refill           Recent Outpatient Visits              1 month ago Acute pain of left shoulder    [address]    Office Visit    8 months ago Type 2 diabetes mellitus without complication, without long-term current use of insulin (HCC)    [address] Maria M Wei PA-C    Office Visit    10 months ago Essential hypertension    [address]    Office Visit    11 months ago Routine general medical examination at a health care facility    [address] Maria M Wei PA-C    Office Visit    1 year ago Diarrhea, unspecified type    [address] Maria M Wei PA-C    Telemedicine          Future Appointments         Provider Department Appt Notes    In 1 month Maria M Garcia PA-C  [facility], [address] [place] [address] Diabetes follow up

## 2024-12-20 ENCOUNTER — LAB ENCOUNTER (OUTPATIENT)
Dept: LAB | Age: 44
End: 2024-12-20
Attending: PHYSICIAN ASSISTANT
Payer: COMMERCIAL

## 2024-12-20 DIAGNOSIS — R79.89 LOW TESTOSTERONE: ICD-10-CM

## 2024-12-20 DIAGNOSIS — E11.9 DIABETES MELLITUS (HCC): Primary | ICD-10-CM

## 2024-12-20 DIAGNOSIS — M79.10 MYALGIA: ICD-10-CM

## 2024-12-20 DIAGNOSIS — M25.50 ARTHRALGIA, UNSPECIFIED JOINT: ICD-10-CM

## 2024-12-20 LAB — VIT D+METAB SERPL-MCNC: 62.1 NG/ML (ref 30–100)

## 2024-12-20 PROCEDURE — 82550 ASSAY OF CK (CPK): CPT | Performed by: PHYSICIAN ASSISTANT

## 2024-12-20 PROCEDURE — 84403 ASSAY OF TOTAL TESTOSTERONE: CPT | Performed by: PHYSICIAN ASSISTANT

## 2024-12-20 PROCEDURE — 82306 VITAMIN D 25 HYDROXY: CPT | Performed by: PHYSICIAN ASSISTANT

## 2024-12-20 PROCEDURE — 86140 C-REACTIVE PROTEIN: CPT | Performed by: PHYSICIAN ASSISTANT

## 2024-12-20 PROCEDURE — 86431 RHEUMATOID FACTOR QUANT: CPT | Performed by: PHYSICIAN ASSISTANT

## 2024-12-20 PROCEDURE — 86225 DNA ANTIBODY NATIVE: CPT | Performed by: PHYSICIAN ASSISTANT

## 2024-12-20 PROCEDURE — 82043 UR ALBUMIN QUANTITATIVE: CPT | Performed by: PHYSICIAN ASSISTANT

## 2024-12-20 PROCEDURE — 86038 ANTINUCLEAR ANTIBODIES: CPT | Performed by: PHYSICIAN ASSISTANT

## 2024-12-20 PROCEDURE — 84402 ASSAY OF FREE TESTOSTERONE: CPT | Performed by: PHYSICIAN ASSISTANT

## 2024-12-20 PROCEDURE — 83036 HEMOGLOBIN GLYCOSYLATED A1C: CPT | Performed by: PHYSICIAN ASSISTANT

## 2024-12-20 PROCEDURE — 82570 ASSAY OF URINE CREATININE: CPT | Performed by: PHYSICIAN ASSISTANT

## 2024-12-20 PROCEDURE — 86200 CCP ANTIBODY: CPT | Performed by: PHYSICIAN ASSISTANT

## 2024-12-20 PROCEDURE — 85652 RBC SED RATE AUTOMATED: CPT | Performed by: PHYSICIAN ASSISTANT

## 2024-12-23 LAB
FREE TESTOST DIRECT: 2.2 PG/ML
TESTOSTERONE: 196 NG/DL

## 2024-12-24 LAB
DSDNA IGG SERPL IA-ACNC: 1.2 IU/ML
ENA AB SER QL IA: 0.1 UG/L
ENA AB SER QL IA: NEGATIVE

## 2024-12-27 DIAGNOSIS — R79.89 LOW TESTOSTERONE: Primary | ICD-10-CM

## 2025-01-21 ENCOUNTER — HOSPITAL ENCOUNTER (INPATIENT)
Facility: HOSPITAL | Age: 45
LOS: 1 days | Discharge: HOME | End: 2025-01-23
Attending: EMERGENCY MEDICINE | Admitting: STUDENT IN AN ORGANIZED HEALTH CARE EDUCATION/TRAINING PROGRAM
Payer: COMMERCIAL

## 2025-01-21 ENCOUNTER — APPOINTMENT (OUTPATIENT)
Dept: CT IMAGING | Age: 45
End: 2025-01-21
Attending: EMERGENCY MEDICINE
Payer: COMMERCIAL

## 2025-01-21 DIAGNOSIS — K56.0 PARALYTIC ILEUS (HCC): Primary | ICD-10-CM

## 2025-01-21 DIAGNOSIS — N17.9 ACUTE KIDNEY INJURY: ICD-10-CM

## 2025-01-21 DIAGNOSIS — R19.7 DIARRHEA, UNSPECIFIED TYPE: ICD-10-CM

## 2025-01-21 PROBLEM — E87.6 HYPOKALEMIA: Status: ACTIVE | Noted: 2025-01-21

## 2025-01-21 PROBLEM — R73.9 HYPERGLYCEMIA: Status: ACTIVE | Noted: 2025-01-21

## 2025-01-21 PROBLEM — E87.1 HYPONATREMIA: Status: ACTIVE | Noted: 2025-01-21

## 2025-01-21 LAB
ALBUMIN SERPL-MCNC: 4.4 G/DL (ref 3.2–4.8)
ALBUMIN/GLOB SERPL: 1.4 {RATIO} (ref 1–2)
ALP LIVER SERPL-CCNC: 87 U/L
ALT SERPL-CCNC: 37 U/L
ANION GAP SERPL CALC-SCNC: 6 MMOL/L (ref 0–18)
AST SERPL-CCNC: 14 U/L (ref ?–34)
BASOPHILS # BLD: 0 X10(3) UL (ref 0–0.2)
BASOPHILS NFR BLD: 0 %
BILIRUB SERPL-MCNC: 0.7 MG/DL (ref 0.3–1.2)
BILIRUB UR QL STRIP.AUTO: NEGATIVE
BUN BLD-MCNC: 17 MG/DL (ref 9–23)
CALCIUM BLD-MCNC: 10.4 MG/DL (ref 8.7–10.6)
CHLORIDE SERPL-SCNC: 100 MMOL/L (ref 98–112)
CLARITY UR REFRACT.AUTO: CLEAR
CO2 SERPL-SCNC: 26 MMOL/L (ref 21–32)
COLOR UR AUTO: YELLOW
CREAT BLD-MCNC: 1.82 MG/DL
EGFRCR SERPLBLD CKD-EPI 2021: 47 ML/MIN/1.73M2 (ref 60–?)
EOSINOPHIL # BLD: 0 X10(3) UL (ref 0–0.7)
EOSINOPHIL NFR BLD: 0 %
ERYTHROCYTE [DISTWIDTH] IN BLOOD BY AUTOMATED COUNT: 13 %
GLOBULIN PLAS-MCNC: 3.1 G/DL (ref 2–3.5)
GLUCOSE BLD-MCNC: 162 MG/DL (ref 70–99)
GLUCOSE UR STRIP.AUTO-MCNC: NEGATIVE MG/DL
HCT VFR BLD AUTO: 55.6 %
HGB BLD-MCNC: 20.8 G/DL
KETONES UR STRIP.AUTO-MCNC: NEGATIVE MG/DL
LEUKOCYTE ESTERASE UR QL STRIP.AUTO: NEGATIVE
LIPASE SERPL-CCNC: 38 U/L (ref 12–53)
LYMPHOCYTES NFR BLD: 4.02 X10(3) UL (ref 1–4)
LYMPHOCYTES NFR BLD: 41 %
MCH RBC QN AUTO: 31.6 PG (ref 26–34)
MCHC RBC AUTO-ENTMCNC: 37.4 G/DL (ref 31–37)
MCV RBC AUTO: 84.5 FL
MONOCYTES # BLD: 0.88 X10(3) UL (ref 0.1–1)
MONOCYTES NFR BLD: 9 %
MORPHOLOGY: NORMAL
NEUTROPHILS # BLD AUTO: 4.55 X10 (3) UL (ref 1.5–7.7)
NEUTROPHILS NFR BLD: 44 %
NEUTS BAND NFR BLD: 6 %
NEUTS HYPERSEG # BLD: 4.9 X10(3) UL (ref 1.5–7.7)
NITRITE UR QL STRIP.AUTO: NEGATIVE
OSMOLALITY SERPL CALC.SUM OF ELEC: 279 MOSM/KG (ref 275–295)
PH UR STRIP.AUTO: 6 [PH] (ref 5–8)
PLATELET # BLD AUTO: 319 10(3)UL (ref 150–450)
PLATELET MORPHOLOGY: NORMAL
POTASSIUM SERPL-SCNC: 3.4 MMOL/L (ref 3.5–5.1)
PROT SERPL-MCNC: 7.5 G/DL (ref 5.7–8.2)
PROT UR STRIP.AUTO-MCNC: NEGATIVE MG/DL
RBC # BLD AUTO: 6.58 X10(6)UL
RBC UR QL AUTO: NEGATIVE
SODIUM SERPL-SCNC: 132 MMOL/L (ref 136–145)
SP GR UR STRIP.AUTO: <=1.005 (ref 1–1.03)
TOTAL CELLS COUNTED BLD: 100
UROBILINOGEN UR STRIP.AUTO-MCNC: 0.2 MG/DL
WBC # BLD AUTO: 9.8 X10(3) UL (ref 4–11)

## 2025-01-21 PROCEDURE — 99223 1ST HOSP IP/OBS HIGH 75: CPT | Performed by: STUDENT IN AN ORGANIZED HEALTH CARE EDUCATION/TRAINING PROGRAM

## 2025-01-21 PROCEDURE — 74177 CT ABD & PELVIS W/CONTRAST: CPT | Performed by: EMERGENCY MEDICINE

## 2025-01-21 RX ORDER — NICOTINE POLACRILEX 4 MG
30 LOZENGE BUCCAL
Status: DISCONTINUED | OUTPATIENT
Start: 2025-01-21 | End: 2025-01-23

## 2025-01-21 RX ORDER — ENOXAPARIN SODIUM 100 MG/ML
40 INJECTION SUBCUTANEOUS DAILY
Status: DISCONTINUED | OUTPATIENT
Start: 2025-01-22 | End: 2025-01-22

## 2025-01-21 RX ORDER — HYDROMORPHONE HYDROCHLORIDE 1 MG/ML
0.5 INJECTION, SOLUTION INTRAMUSCULAR; INTRAVENOUS; SUBCUTANEOUS
Status: DISCONTINUED | OUTPATIENT
Start: 2025-01-21 | End: 2025-01-23

## 2025-01-21 RX ORDER — HYDROMORPHONE HYDROCHLORIDE 1 MG/ML
0.5 INJECTION, SOLUTION INTRAMUSCULAR; INTRAVENOUS; SUBCUTANEOUS EVERY 30 MIN PRN
Status: DISCONTINUED | OUTPATIENT
Start: 2025-01-21 | End: 2025-01-23

## 2025-01-21 RX ORDER — BENZONATATE 100 MG/1
200 CAPSULE ORAL 3 TIMES DAILY PRN
Status: DISCONTINUED | OUTPATIENT
Start: 2025-01-21 | End: 2025-01-23

## 2025-01-21 RX ORDER — ONDANSETRON 2 MG/ML
4 INJECTION INTRAMUSCULAR; INTRAVENOUS EVERY 6 HOURS PRN
Status: DISCONTINUED | OUTPATIENT
Start: 2025-01-21 | End: 2025-01-23

## 2025-01-21 RX ORDER — SODIUM CHLORIDE, SODIUM LACTATE, POTASSIUM CHLORIDE, CALCIUM CHLORIDE 600; 310; 30; 20 MG/100ML; MG/100ML; MG/100ML; MG/100ML
INJECTION, SOLUTION INTRAVENOUS CONTINUOUS
Status: DISCONTINUED | OUTPATIENT
Start: 2025-01-21 | End: 2025-01-23

## 2025-01-21 RX ORDER — IOPAMIDOL 755 MG/ML
100 INJECTION, SOLUTION INTRAVASCULAR
Status: COMPLETED | OUTPATIENT
Start: 2025-01-21 | End: 2025-01-21

## 2025-01-21 RX ORDER — NICOTINE POLACRILEX 4 MG
15 LOZENGE BUCCAL
Status: DISCONTINUED | OUTPATIENT
Start: 2025-01-21 | End: 2025-01-23

## 2025-01-21 RX ORDER — PROCHLORPERAZINE EDISYLATE 5 MG/ML
5 INJECTION INTRAMUSCULAR; INTRAVENOUS EVERY 8 HOURS PRN
Status: DISCONTINUED | OUTPATIENT
Start: 2025-01-21 | End: 2025-01-23

## 2025-01-21 RX ORDER — ONDANSETRON 2 MG/ML
4 INJECTION INTRAMUSCULAR; INTRAVENOUS ONCE
Status: COMPLETED | OUTPATIENT
Start: 2025-01-21 | End: 2025-01-21

## 2025-01-21 RX ORDER — ECHINACEA PURPUREA EXTRACT 125 MG
1 TABLET ORAL
Status: DISCONTINUED | OUTPATIENT
Start: 2025-01-21 | End: 2025-01-23

## 2025-01-21 RX ORDER — FAMOTIDINE 10 MG/ML
20 INJECTION, SOLUTION INTRAVENOUS ONCE
Status: COMPLETED | OUTPATIENT
Start: 2025-01-21 | End: 2025-01-21

## 2025-01-21 RX ORDER — DEXTROSE MONOHYDRATE 25 G/50ML
50 INJECTION, SOLUTION INTRAVENOUS
Status: DISCONTINUED | OUTPATIENT
Start: 2025-01-21 | End: 2025-01-23

## 2025-01-21 RX ORDER — ACETAMINOPHEN 500 MG
500 TABLET ORAL EVERY 4 HOURS PRN
Status: DISCONTINUED | OUTPATIENT
Start: 2025-01-21 | End: 2025-01-23

## 2025-01-21 RX ORDER — DIPHENHYDRAMINE HYDROCHLORIDE 50 MG/ML
25 INJECTION INTRAMUSCULAR; INTRAVENOUS ONCE
Status: COMPLETED | OUTPATIENT
Start: 2025-01-21 | End: 2025-01-21

## 2025-01-21 RX ORDER — METOCLOPRAMIDE HYDROCHLORIDE 5 MG/ML
10 INJECTION INTRAMUSCULAR; INTRAVENOUS ONCE
Status: COMPLETED | OUTPATIENT
Start: 2025-01-21 | End: 2025-01-21

## 2025-01-21 RX ORDER — DICYCLOMINE HYDROCHLORIDE 10 MG/ML
10 INJECTION INTRAMUSCULAR ONCE
Status: COMPLETED | OUTPATIENT
Start: 2025-01-21 | End: 2025-01-21

## 2025-01-21 NOTE — ED INITIAL ASSESSMENT (HPI)
Diarrhea started last week, now with lower abd pain since Friday on and off. Did have an increase in mounjaro prior to this

## 2025-01-22 LAB
ADENOVIRUS F 40/41 PCR: NEGATIVE
ANION GAP SERPL CALC-SCNC: 12 MMOL/L (ref 0–18)
ASTROVIRUS PCR: NEGATIVE
BASOPHILS # BLD AUTO: 0.06 X10(3) UL (ref 0–0.2)
BASOPHILS NFR BLD AUTO: 0.5 %
BUN BLD-MCNC: 15 MG/DL (ref 9–23)
C CAYETANENSIS DNA SPEC QL NAA+PROBE: NEGATIVE
C DIFF TOX B STL QL: NEGATIVE
CALCIUM BLD-MCNC: 8.6 MG/DL (ref 8.7–10.6)
CAMPY SP DNA.DIARRHEA STL QL NAA+PROBE: NEGATIVE
CHLORIDE SERPL-SCNC: 106 MMOL/L (ref 98–112)
CO2 SERPL-SCNC: 19 MMOL/L (ref 21–32)
CREAT BLD-MCNC: 1.37 MG/DL
CRYPTOSP DNA SPEC QL NAA+PROBE: NEGATIVE
EAEC PAA PLAS AGGR+AATA ST NAA+NON-PRB: NEGATIVE
EC STX1+STX2 + H7 FLIC SPEC NAA+PROBE: NEGATIVE
EGFRCR SERPLBLD CKD-EPI 2021: 66 ML/MIN/1.73M2 (ref 60–?)
ENTAMOEBA HISTOLYTICA PCR: NEGATIVE
EOSINOPHIL # BLD AUTO: 0.43 X10(3) UL (ref 0–0.7)
EOSINOPHIL NFR BLD AUTO: 3.5 %
EPEC EAE GENE STL QL NAA+NON-PROBE: NEGATIVE
ERYTHROCYTE [DISTWIDTH] IN BLOOD BY AUTOMATED COUNT: 12.9 %
ETEC LTA+ST1A+ST1B TOX ST NAA+NON-PROBE: NEGATIVE
GIARDIA LAMBLIA PCR: NEGATIVE
GLUCOSE BLD-MCNC: 114 MG/DL (ref 70–99)
GLUCOSE BLD-MCNC: 123 MG/DL (ref 70–99)
GLUCOSE BLD-MCNC: 136 MG/DL (ref 70–99)
GLUCOSE BLD-MCNC: 137 MG/DL (ref 70–99)
GLUCOSE BLD-MCNC: 143 MG/DL (ref 70–99)
GLUCOSE BLD-MCNC: 163 MG/DL (ref 70–99)
HCT VFR BLD AUTO: 49.6 %
HGB BLD-MCNC: 18.4 G/DL
IMM GRANULOCYTES # BLD AUTO: 0.1 X10(3) UL (ref 0–1)
IMM GRANULOCYTES NFR BLD: 0.8 %
LYMPHOCYTES # BLD AUTO: 2.72 X10(3) UL (ref 1–4)
LYMPHOCYTES NFR BLD AUTO: 22.2 %
MAGNESIUM SERPL-MCNC: 1.4 MG/DL (ref 1.6–2.6)
MCH RBC QN AUTO: 31.1 PG (ref 26–34)
MCHC RBC AUTO-ENTMCNC: 37.1 G/DL (ref 31–37)
MCV RBC AUTO: 83.8 FL
MONOCYTES # BLD AUTO: 2.3 X10(3) UL (ref 0.1–1)
MONOCYTES NFR BLD AUTO: 18.8 %
NEUTROPHILS # BLD AUTO: 6.63 X10 (3) UL (ref 1.5–7.7)
NEUTROPHILS # BLD AUTO: 6.63 X10(3) UL (ref 1.5–7.7)
NEUTROPHILS NFR BLD AUTO: 54.2 %
NOROVIRUS GI/GII PCR: NEGATIVE
OSMOLALITY SERPL CALC.SUM OF ELEC: 287 MOSM/KG (ref 275–295)
P SHIGELLOIDES DNA STL QL NAA+PROBE: NEGATIVE
PLATELET # BLD AUTO: 273 10(3)UL (ref 150–450)
POTASSIUM SERPL-SCNC: 3.6 MMOL/L (ref 3.5–5.1)
POTASSIUM SERPL-SCNC: 3.6 MMOL/L (ref 3.5–5.1)
RBC # BLD AUTO: 5.92 X10(6)UL
ROTAVIRUS A PCR: NEGATIVE
SALMONELLA DNA SPEC QL NAA+PROBE: NEGATIVE
SAPOVIRUS PCR: NEGATIVE
SHIGELLA SP+EIEC IPAH ST NAA+NON-PROBE: NEGATIVE
SODIUM SERPL-SCNC: 137 MMOL/L (ref 136–145)
V CHOLERAE DNA SPEC QL NAA+PROBE: NEGATIVE
VIBRIO DNA SPEC NAA+PROBE: NEGATIVE
WBC # BLD AUTO: 12.2 X10(3) UL (ref 4–11)
YERSINIA DNA SPEC NAA+PROBE: NEGATIVE

## 2025-01-22 PROCEDURE — 99232 SBSQ HOSP IP/OBS MODERATE 35: CPT | Performed by: HOSPITALIST

## 2025-01-22 RX ORDER — ENOXAPARIN SODIUM 100 MG/ML
0.5 INJECTION SUBCUTANEOUS EVERY 12 HOURS SCHEDULED
Status: DISCONTINUED | OUTPATIENT
Start: 2025-01-22 | End: 2025-01-23

## 2025-01-22 RX ORDER — DEXTROAMPHETAMINE SACCHARATE, AMPHETAMINE ASPARTATE, DEXTROAMPHETAMINE SULFATE AND AMPHETAMINE SULFATE 2.5; 2.5; 2.5; 2.5 MG/1; MG/1; MG/1; MG/1
20 TABLET ORAL DAILY
Status: DISCONTINUED | OUTPATIENT
Start: 2025-01-22 | End: 2025-01-23

## 2025-01-22 RX ORDER — ALLOPURINOL 100 MG/1
100 TABLET ORAL DAILY
Status: DISCONTINUED | OUTPATIENT
Start: 2025-01-22 | End: 2025-01-23

## 2025-01-22 RX ORDER — LOSARTAN POTASSIUM 100 MG/1
100 TABLET ORAL DAILY
Status: DISCONTINUED | OUTPATIENT
Start: 2025-01-22 | End: 2025-01-23

## 2025-01-22 RX ORDER — PANTOPRAZOLE SODIUM 20 MG/1
20 TABLET, DELAYED RELEASE ORAL
Status: DISCONTINUED | OUTPATIENT
Start: 2025-01-22 | End: 2025-01-23

## 2025-01-22 RX ORDER — HYDROCHLOROTHIAZIDE 25 MG/1
25 TABLET ORAL DAILY
Status: SHIPPED | COMMUNITY
Start: 2025-01-22

## 2025-01-22 NOTE — PROGRESS NOTES
Pharmacy Progress Note:  Anticoagulation Dose Adjustment     Eleazar Das is a 44 year old male on enoxaparin for VTE prophylaxis.  Anti-factor Xa levels are being monitored due to the patient's high risk status of obesity.    Relevant labs:    Body mass index is 46.51 kg/m².    Wt Readings from Last 1 Encounters:   01/21/25 (!) 159.9 kg (352 lb 8 oz)       Lab Results   Component Value Date    CREATSERUM 1.37 (H) 01/22/2025       No results found for: \"XA\"    Based on the above, enoxaparin dose will be adjusted to 80 mg (0.5 mg/gk) subQ every 12 hours, to begin 1/22/25 at 2100 . (Of note, patient did receive a enoxaparin 40mg subQ x 1 dose on 1/22/25 at 0849 due to an auto-verified order.  Next Anti-factor Xa Peak drawn 4 hours after dosewill be ordered on 1/24/25 around 1300.    Thank you,  Sal Hernandez, PharmD  1/22/2025 3:13 PM

## 2025-01-22 NOTE — PROGRESS NOTES
[facility]   part of [facility]     Hospitalist Progress Note     Eleazar Das Patient Status:  Inpatient    1980 MRN [id number]   [facility] 3NW-A Attending Kari Coulter, DO   Hosp Day # 0 PCP Yasmany Ochoa MD     Chief Complaint: diarrhea    Subjective:     Patient small amt of diarrhea o/n, none since. Hungry.    Objective:    Review of Systems:   A comprehensive review of systems was completed; pertinent positive and negatives stated in subjective.    Vital signs:  Temp:  [98 °F (36.7 °C)-98.2 °F (36.8 °C)] 98 °F (36.7 °C)  Pulse:  [] 114  Resp:  [18-22] 18  BP: (132-174)/() 171/105  SpO2:  [93 %-100 %] 100 %    Physical Exam:    General: No acute distress  Respiratory: No wheezes, no rhonchi  Cardiovascular: S1, S2, regular rate and rhythm  Abdomen: Soft, Non-tender, non-distended, positive bowel sounds  Neuro: No new focal deficits.   Extremities: No edema      Diagnostic Data:    Labs:  Recent Labs   Lab 25  1653 25  0522   WBC 9.8 12.2*   HGB 20.8* 18.4*   MCV 84.5 83.8   .0 273.0   BAND 6  --        Recent Labs   Lab 25  1653 25  0522   * 143*   BUN 17 15   CREATSERUM 1.82* 1.37*   CA 10.4 8.6*   ALB 4.4  --    * 137   K 3.4* 3.6  3.6    106   CO2 26.0 19.0*   ALKPHO 87  --    AST 14  --    ALT 37  --    BILT 0.7  --    TP 7.5  --        Estimated Glomerular Filtration Rate: 65.6 mL/min/1.73m2 (A) (by CKD-EPI based on SCr of 1.37 mg/dL (H)).    No results for input(s): \"TROP\", \"TROPHS\", \"CK\" in the last 168 hours.    No results for input(s): \"PTP\", \"INR\" in the last 168 hours.               Microbiology    No results found for this visit on 25.      Imaging: Reviewed in Epic.    Medications:    amphetamine-dextroamphetamine  20 mg Oral Daily    pantoprazole  20 mg Oral QAM AC    losartan  100 mg Oral Daily    allopurinol  100 mg Oral Daily    enoxaparin  40 mg Subcutaneous Daily    insulin aspart   2-10 Units Subcutaneous q6h       Assessment & Plan:      #Paralytic Ileus  -CT showing air-fluid filled levels in multiple loops of small and large intestine concerning for paralytic ileus, mild hepatic steatosis  -may be 2/2 Mounjaro as this a known potential side effect  -ADAT  -LR @100 mls/h  -zofran prn  -monitor for improvement in symptoms  -await GI stool panel     #hyponatremia  #hypokalemia  -Na corrects to 133 for hyperglycemia  -suspect multifactorial from Babar effect from hypoK and SIADH from pain  -improved     #ORALIA  -improving  -Bun: Cr ratio <20 suggestive of pre-renal  -voided  -IVF as above  -resume home meds     #polycythemia  -hgb 20.8  -suspect in part from volume depletion leading to heme-concentration  -cont to trend CBC. Repeat as outpt     #DM  -SSI, Q6H checks, hypoglycemia protocol  -hold home Mounjaro     #HTN  -resume     #gout     #GERD  -cont home PPI    Dispo: possible dc if tolerates diet    Dustin Duenas MD    Supplementary Documentation:     Quality:  DVT Mechanical Prophylaxis:   SCDs,    DVT Pharmacologic Prophylaxis   Medication    enoxaparin (Lovenox) 100 MG/ML SUBQ injection 40 mg                Code Status: Not on file  Arnold: No urinary catheter in place  Arnold Duration (in days):   Central line:    JAISON:     Discharge is dependent on: course  At this point Mr. Das is expected to be discharge to: home    The 21st Century Cures Act makes medical notes like these available to patients in the interest of transparency. Please be advised this is a medical document. Medical documents are intended to carry relevant information, facts as evident, and the clinical opinion of the practitioner. The medical note is intended as peer to peer communication and may appear blunt or direct. It is written in medical language and may contain abbreviations or verbiage that are unfamiliar.

## 2025-01-22 NOTE — PLAN OF CARE
Alert and oriented x4. NSR 90s on telemetry, ST 130s-140s when ambulating, telemetry per ELVIE protocol. Denies lightheadedness, chest pain, dyspnea.  Tolerating room air.  Pain rated minimally, declining pain medication.   Isolation precautions. Loose stool, stool sample sent for C.Diff. GI stool study pending from ED  Up with steady gait, independent after setup.  Declining SCDs. Orders for AM lovenox prophylaxis.  Potassium, Magnesium replaced per protocol    Bladder scan per order:     01/21/25 2341   Urine Output/Assessment   Bladder Scan Volume (mL) 134 mL

## 2025-01-22 NOTE — PROGRESS NOTES
A&Ox4. VSS. RA. . ELVIE no CPAP  Telemetry: NSR, ST with ambulation   GI: Abdomen soft, nondistended. Passing gas.  Denies nausea.  : Voids.  Pain controlled with PRN pain medications  Up ad taj  Diet: soft, ADAT  IVF running per order. S.lock  All appropriate safety measures in place. Patient updated on plan of care. All questions and concerns addressed.

## 2025-01-22 NOTE — ED QUICK NOTES
Orders for admission, patient is aware of plan and ready to go upstairs. Any questions, please call ED RN yanna at extension [phone].     Patient Covid vaccination status: Fully vaccinated     COVID Test Ordered in ED: None    COVID Suspicion at Admission: N/A    Running Infusions:  None    Mental Status/LOC at time of transport: A&Ox4    Other pertinent information:   CIWA score: N/A   NIH score:  N/A

## 2025-01-22 NOTE — PROGRESS NOTES
NURSING ADMISSION NOTE      Patient admitted via Wheelchair from transfer from [facility]  Oriented to room.  Safety precautions initiated.  Bed in low position.  Call light in reach.  Alert and oriented x4.  Belongings with patient.    Skin check performed with Jana GODOY. Sacrum CDI. BLE CDI. Neck CDI.

## 2025-01-22 NOTE — H&P
[facility] HOSPITAL[facility]  History and Physical     Eleazar Das Patient Status:  Emergency    1980 MRN [id number]   Location [facility] EMERGENCY DEPARTMENT IN [place] Attending Romy Doherty MD   Hosp Day # 0 PCP Yasmany Ochoa MD     Chief Complaint: diarrhea, abdominal pain    Subjective:    History of Present Illness:     Eleazar Das is a 44 year old male with PMHx ELVIE/ HTN/ gout/ hypogonadotropic hypogonadism/ DM who presented to the hospital for abdominal pain. His dose of Mounjaro was increased 9 days ago to help with weight loss. About 7 days ago he started to have nausea and emesis as well as watery diarrhea, 7-9 episodes daily. He developed periumbilical abdominal pain over the next few days which was bloating and cramping rated 7-9/10 with no specific alleviating or exacerbating factors. He was able to tolerate liquids but was not eating much over this time period. He denied any fever, chills, bloody stools.    History/Other:    Past Medical History:  Past Medical History:    ADHD (attention deficit hyperactivity disorder)    Back pain    Gout    Hypogonadotropic hypogonadism (HCC)    Obesity, unspecified    Obstructive apnea    DMG HST AHI 11    Unspecified essential hypertension     Past Surgical History:   Past Surgical History:   Procedure Laterality Date    Colonoscopy      Other      treatment of left wrist, fracture    Tonsillectomy  00      Family History:   Family History   Problem Relation Age of Onset    Hypertension Mother      Social History:    reports that he quit smoking about 18 years ago. His smoking use included cigarettes. He has never used smokeless tobacco. He reports current alcohol use. He reports that he does not use drugs.     Allergies: Allergies[1]    Medications:  Medications Ordered Prior to Encounter[2]    Review of Systems:   A comprehensive review of systems was completed.    Pertinent positives and negatives noted in the HPI.    Objective:   Physical  Exam:    BP (!) 144/94   Pulse 102   Temp 98 °F (36.7 °C) (Oral)   Resp 20   Wt (!) 350 lb (158.8 kg)   SpO2 95%   BMI 46.18 kg/m²   General: No acute distress, Alert  Respiratory: No rhonchi, no wheezes  Cardiovascular: S1, S2. Regular rate and rhythm  Abdomen: Soft, Non-tender, non-distended, positive bowel sounds  Neuro: No new focal deficits  Extremities: trace bl pre-tibial edema    Results:    Labs:      Labs Last 24 Hours:    Recent Labs   Lab 01/21/25  1653   RBC 6.58*   HGB 20.8*   HCT 55.6*   MCV 84.5   MCH 31.6   MCHC 37.4*   RDW 13.0   NEPRELIM 4.55   WBC 9.8   .0       Recent Labs   Lab 01/21/25  1653   *   BUN 17   CREATSERUM 1.82*   EGFRCR 47*   CA 10.4   ALB 4.4   *   K 3.4*      CO2 26.0   ALKPHO 87   AST 14   ALT 37   BILT 0.7   TP 7.5       Estimated Glomerular Filtration Rate: 46.7 mL/min/1.73m2 (A) (by CKD-EPI based on SCr of 1.82 mg/dL (H)).    No results found for: \"PT\", \"INR\"    No results for input(s): \"TROP\", \"TROPHS\", \"CK\" in the last 168 hours.    No results for input(s): \"TROP\", \"PBNP\" in the last 168 hours.    No results for input(s): \"PCT\" in the last 168 hours.    Imaging: Imaging data reviewed in Epic.    Assessment & Plan:      #Paralytic Ileus  -CT showing air-fluid filled levels in multiple loops of small and large intestine concerning for paralytic ileus, mild hepatic steatosis  -may be 2/2 Mounjaro as this a known potential side effect  -NPO except meds  -LR @100 mls/h  -pain control: tylenol first then dilaudid prn breakthrough pain  -zofran prn  -monitor for improvement in symptoms    #hyponatremia  #hypokalemia  -Na corrects to 133 for hyperglycemia  -suspect multifactorial from Babar effect from hypoK and SIADH from pain  -correct K to 4  -check Mg level  -can allow Na to correct to normal over 24 hrs  -cont to monitor BMP    #ORALIA  -Cr 1.82 with BUN 17: baseline 1.04 with GFR 91  -Bun: Cr ratio <20 suggestive of pre-renal vs  post-renal  -check PVR  -cont to monitor BMP  -strict I/O, avoid nephrotoxins  -IVF as above  -hold home allopurinol, losartan, HCTZ    #polycythemia  -hgb 20.8  -suspect in part from volume depletion leading to heme-concentration  -cont to trend CBC    #DM  -SSI, Q6H checks, hypoglycemia protocol  -hold home Mounjaro    #HTN  -holding home meds as above    #gout  -holding home meds as above    #GERD  -cont home PPI        Plan of care discussed with ED physician    Kari Coulter DO    Supplementary Documentation:     The 21st Century Cures Act makes medical notes like these available to patients in the interest of transparency. Please be advised this is a medical document. Medical documents are intended to carry relevant information, facts as evident, and the clinical opinion of the practitioner. The medical note is intended as peer to peer communication and may appear blunt or direct. It is written in medical language and may contain abbreviations or verbiage that are unfamiliar.                                       [1] No Known Allergies  [2]   No current facility-administered medications on file prior to encounter.     Current Outpatient Medications on File Prior to Encounter   Medication Sig Dispense Refill    Tirzepatide (MOUNJARO) 10 MG/0.5ML Subcutaneous Solution Auto-injector Inject 10 mg into the skin once a week. 2 mL 0    pantoprazole (PROTONIX) 20 MG Oral Tab EC Take 1 tablet (20 mg total) by mouth every morning before breakfast. 90 tablet 1    amphetamine-dextroamphetamine (ADDERALL) 20 MG Oral Tab Take 1 tablet (20 mg total) by mouth 2 (two) times daily. 60 tablet 0    allopurinol 100 MG Oral Tab Take 1 tablet (100 mg total) by mouth 2 (two) times daily. 180 tablet 3    losartan 100 MG Oral Tab Take 1 tablet (100 mg total) by mouth daily. 90 tablet 3    hydroCHLOROthiazide 25 MG Oral Tab Take 1 tablet (25 mg total) by mouth daily. 90 tablet 3

## 2025-01-22 NOTE — ED PROVIDER NOTES
Patient Seen in: [facility] In [place]      History     Chief Complaint   Patient presents with    Abdomen/Flank Pain     Stated Complaint: flu like symptoms since wednesday    Subjective:   HPI      Patient presents with abdominal pain and diarrhea.  The patient states the symptoms started about a week ago.  He reports that the diarrhea was severe initially.  It is improving but slowly.  He states he has had 5 episodes of fairly watery, nonbloody stools today.  He feels pain around the umbilical region.  He states that it is sometimes worse before he has to have a bowel movement but having a bowel movement does not completely relieve it.  It seems to wax and wane in intensity even unrelated to eating or bowel movements.  He vomited once on Saturday and not since but has continued to feel nauseated.  He is somewhat forcing himself to eat.  He has felt hot and cold but not had a fever.  He denies any sick contacts in the family.  He denies any recent travel or antibiotics.  He did have an increase in his Mounjaro dosing from 7.5-10 the  before the symptoms started.  He really has not had side effects from Mounjaro previously.    Objective:     Past Medical History:    ADHD (attention deficit hyperactivity disorder)    Back pain    Gout    Hypogonadotropic hypogonadism (HCC)    Obesity, unspecified    Obstructive apnea    DMG HST AHI 11    Unspecified essential hypertension              Past Surgical History:   Procedure Laterality Date    Colonoscopy      Other      treatment of left wrist, fracture    Tonsillectomy  00                Social History     Socioeconomic History    Marital status:    Tobacco Use    Smoking status: Former     Current packs/day: 0.00     Types: Cigarettes     Quit date: 2006     Years since quittin.1    Smokeless tobacco: Never   Vaping Use    Vaping status: Never Used   Substance and Sexual Activity    Alcohol use: Yes     Alcohol/week: 0.0  standard drinks of alcohol     Comment: rare    Drug use: No    Sexual activity: Yes     Partners: Female   Other Topics Concern     Service No    Blood Transfusions No    Caffeine Concern Yes    Hobby Hazards Yes    Exercise No                  Physical Exam     ED Triage Vitals [01/21/25 1649]   BP (!) 165/109   Pulse 113   Resp 22   Temp 98 °F (36.7 °C)   Temp src Oral   SpO2 97 %   O2 Device None (Room air)       Current Vitals:   Vital Signs  BP: 152/89  Pulse: 96  Resp: 22  Temp: 98 °F (36.7 °C)  Temp src: Oral    Oxygen Therapy  SpO2: 99 %  O2 Device: None (Room air)        Physical Exam  General: Alert and oriented x3 in no acute distress.  Cardiovascular: Regular rate and rhythm, no murmurs.  Respiratory: Lungs clear to auscultation.  Abdomen: Soft, diffusely tender to palpation, no rebound or guarding, normal active bowel sounds, no CVA tenderness.  Extremities: No CCE.  Skin: Warm and dry.    ED Course     Labs Reviewed   COMP METABOLIC PANEL (14) - Abnormal; Notable for the following components:       Result Value    Glucose 162 (*)     Sodium 132 (*)     Potassium 3.4 (*)     Creatinine 1.82 (*)     eGFR-Cr 47 (*)     All other components within normal limits   CBC WITH DIFFERENTIAL WITH PLATELET - Abnormal; Notable for the following components:    RBC 6.58 (*)     HGB 20.8 (*)     HCT 55.6 (*)     MCHC 37.4 (*)     All other components within normal limits   MANUAL DIFFERENTIAL - Abnormal; Notable for the following components:    Lymphocyte Absolute Manual 4.02 (*)     All other components within normal limits   LIPASE - Normal   URINALYSIS, ROUTINE   SCAN SLIDE   GI STOOL PANEL BY PCR   C. DIFFICILE(TOXIGENIC)PCR          CT ABDOMEN+PELVIS(CONTRAST ONLY)(CPT=74177)    Result Date: 1/21/2025  PROCEDURE:  CT ABDOMEN+PELVIS (CONTRAST ONLY) (CPT=74177)  COMPARISON:  [facility] , CT, CT ABDOMEN+PELVIS KIDNEYSTONE 2D RNDR(NO IV,NO ORAL)(CPT=74176), 11/16/2021, 9:56 PM.  INDICATIONS:  abd pain, diarrhea   TECHNIQUE:  CT scanning was performed from the dome of the diaphragm to the pubic symphysis with non-ionic intravenous contrast material. Post contrast coronal MPR imaging was performed.  Dose reduction techniques were used. Dose information is transmitted to the ACR (American College of Radiology) NRDR (National Radiology Data Registry) which includes the Dose Index Registry.  PATIENT STATED HISTORY:(As transcribed by Technologist)  abd pain, diarrhea   CONTRAST USED:  100cc of Isovue 370  FINDINGS:  LIVER:  Diffuse mild low attenuation.  No mass. BILIARY:  No visible dilatation or calcification.  PANCREAS:  No lesion, fluid collection, ductal dilatation, or atrophy.  SPLEEN:  No enlargement or focal lesion.  KIDNEYS:  No mass, obstruction, or calcification.  ADRENALS:  No mass or enlargement.  AORTA/VASCULAR:  No aneurysm or dissection.  RETROPERITONEUM:  No mass or adenopathy.  BOWEL/MESENTERY:  Air-fluid levels in multiple mildly distended mid abdominal small bowel loops and in the colon.  No abrupt caliber transition.  No bowel wall thickening.  Normal appendix. ABDOMINAL WALL:  No mass or hernia.  URINARY BLADDER:  Collapsed.  No visible focal wall thickening, lesion, or calculus.  PELVIC NODES:  No adenopathy.  PELVIC ORGANS:  No visible mass.  Pelvic organs appropriate for patient age.  BONES:  Degenerative changes of spine. LUNG BASES:  No visible pulmonary or pleural disease.              CONCLUSION:  1. Air-fluid levels in multiple loops of large and small bowel with mild distention of mid abdominal small bowel loops, most suggestive of paralytic ileus.  Partial small bowel obstruction cannot be entirely excluded, with no transition point or obstructing lesion identified. 2. Mild hepatic steatosis.   LOCATION:  [facility]   Dictated by (CST): Jah Ballesteros MD on 1/21/2025 at 6:44 PM     Finalized by (CST): Jah Ballesteros MD on 1/21/2025 at 6:50 PM        Medications   famotidine (Pepcid) 20 mg/2mL injection 20  mg (20 mg Intravenous Given 1/21/25 1723)   metoclopramide (Reglan) 5 mg/mL injection 10 mg (10 mg Intravenous Given 1/21/25 1723)   diphenhydrAMINE (Benadryl) 50 mg/mL  injection 25 mg (25 mg Intravenous Given 1/21/25 1723)   dicyclomine (Bentyl) 10 MG/ML injection 10 mg (10 mg Intramuscular Given 1/21/25 1729)   sodium chloride 0.9 % IV bolus 1,000 mL (0 mL Intravenous Stopped 1/21/25 1813)   sodium chloride 0.9 % IV bolus 1,000 mL (0 mL Intravenous Stopped 1/21/25 1915)   iopamidol (ISOVUE-370) 76 % injection 100 mL (100 mL Intravenous Given 1/21/25 1757)     Patient was given the above medications for nausea and stomach discomfort.  He was hydrated with 2 L of normal saline in total.  He really does not feel any significant improvement in symptoms.     MDM      Patient presents with abdominal pain and diarrhea.  Differential diagnosis includes but is not limited to viral gastroenteritis, acute colitis, acute pancreatitis, dehydration and electrolyte abnormalities.  The patient does not have evidence of colitis but does appear to have an ileus on CT.  He has a normal white blood cell count but an elevated hemoglobin.  I suspect this is due to hemoconcentration as he also has an elevated creatinine of 1.82.  His sodium is low at 132 and his potassium is low at 3.4.  He is mildly hyperglycemic with a glucose of 162.  His pancreatic enzymes are normal and his pancreas appears normal on imaging.  Given the patient's lab abnormalities and ongoing symptoms in the setting of ileus, he will be admitted for further observation and treatment to Dr. Anguiano from the hospitalist service and we have discussed the case.  Stool studies have been sent as well to check for potential bacterial infections.  The patient has had C. difficile in the past but it has been many years and he does not have any current risk factors.    Admission disposition: 1/21/2025  8:01 PM           Medical Decision Making      Disposition and Plan      Clinical Impression:  1. Paralytic ileus (HCC)    2. Acute kidney injury (HCC)    3. Diarrhea, unspecified type         Disposition:  Admit  1/21/2025  8:01 pm    Follow-up:  No follow-up provider specified.        Medications Prescribed:  Current Discharge Medication List              Supplementary Documentation:         Hospital Problems       Present on Admission  Date Reviewed: 12/20/2024            ICD-10-CM Noted POA    * (Principal) Paralytic ileus (HCC) K56.0 1/21/2025 Unknown    Hyperglycemia R73.9 1/21/2025 Yes    Hypokalemia E87.6 1/21/2025 Yes    Hyponatremia E87.1 1/21/2025 Yes

## 2025-01-23 VITALS
BODY MASS INDEX: 41.75 KG/M2 | RESPIRATION RATE: 19 BRPM | HEART RATE: 87 BPM | OXYGEN SATURATION: 96 % | DIASTOLIC BLOOD PRESSURE: 67 MMHG | TEMPERATURE: 98 F | WEIGHT: 315 LBS | SYSTOLIC BLOOD PRESSURE: 136 MMHG | HEIGHT: 73 IN

## 2025-01-23 LAB — GLUCOSE BLD-MCNC: 99 MG/DL (ref 70–99)

## 2025-01-23 PROCEDURE — 99239 HOSP IP/OBS DSCHRG MGMT >30: CPT | Performed by: HOSPITALIST

## 2025-01-23 NOTE — PAYOR COMM NOTE
--------------  ADMISSION REVIEW     Payor: MERRITT AYERS  Subscriber #:  [id number]  Authorization Number: [id number]    ADMIT TO INPT STATUS 1/22/25  ADMIT TO OBSERVATION 1/21/25 1/21 Patient Seen in: [facility] In [place]      History   Stated Complaint: flu like symptoms since wednesday    Patient presents with abdominal pain and diarrhea.  The patient states the symptoms started about a week ago.  He reports that the diarrhea was severe initially.  It is improving but slowly.  He states he has had 5 episodes of fairly watery, nonbloody stools today.  He feels pain around the umbilical region.  He states that it is sometimes worse before he has to have a bowel movement but having a bowel movement does not completely relieve it.  It seems to wax and wane in intensity even unrelated to eating or bowel movements.  He vomited once on Saturday and not since but has continued to feel nauseated.  He is somewhat forcing himself to eat.  He has felt hot and cold but not had a fever.  He denies any sick contacts in the family.  He denies any recent travel or antibiotics.  He did have an increase in his Mounjaro dosing from 7.5-10 the Sunday before the symptoms started.  He really has not had side effects from Mounjaro previously.    Past Medical History:    ADHD (attention deficit hyperactivity disorder)    Back pain    Gout    Hypogonadotropic hypogonadism (HCC)    Obesity, unspecified    Obstructive apnea    DMG HST AHI 11    Unspecified essential hypertension     Past Surgical History:   Procedure Laterality Date    Colonoscopy      Other      treatment of left wrist, fracture    Tonsillectomy  1/1/00     Physical Exam     ED Triage Vitals [01/21/25 1649]   BP (!) 165/109   Pulse 113   Resp 22   Temp 98 °F (36.7 °C)   Temp src Oral   SpO2 97 %   O2 Device None (Room air)     Current Vitals:   Vital Signs  BP: 152/89  Pulse: 96  Resp: 22  Temp: 98 °F (36.7 °C)  Temp src: Oral    Physical  Exam  General: Alert and oriented x3 in no acute distress.  Cardiovascular: Regular rate and rhythm, no murmurs.  Respiratory: Lungs clear to auscultation.  Abdomen: Soft, diffusely tender to palpation, no rebound or guarding, normal active bowel sounds, no CVA tenderness.  Extremities: No CCE.  Skin: Warm and dry.    Labs Reviewed   COMP METABOLIC PANEL (14) - Abnormal; Notable for the following components:       Result Value    Glucose 162 (*)     Sodium 132 (*)     Potassium 3.4 (*)     Creatinine 1.82 (*)     eGFR-Cr 47 (*)     All other components within normal limits   CBC WITH DIFFERENTIAL WITH PLATELET - Abnormal; Notable for the following components:    RBC 6.58 (*)     HGB 20.8 (*)     HCT 55.6 (*)     MCHC 37.4 (*)     All other components within normal limits   MANUAL DIFFERENTIAL - Abnormal; Notable for the following components:    Lymphocyte Absolute Manual 4.02 (*)     All other components within normal limits   LIPASE - Normal   URINALYSIS, ROUTINE   SCAN SLIDE   GI STOOL PANEL BY PCR   C. DIFFICILE(TOXIGENIC)PCR     CT ABDOMEN+PELVIS  1. Air-fluid levels in multiple loops of large and small bowel with mild distention of mid abdominal small bowel loops, most suggestive of paralytic ileus.  Partial small bowel obstruction cannot be entirely excluded, with no transition point or obstructing lesion identified. 2. Mild hepatic steatosis.         Medications   famotidine (Pepcid) 20 mg/2mL injection 20 mg (20 mg Intravenous Given 1/21/25 1723)   metoclopramide (Reglan) 5 mg/mL injection 10 mg (10 mg Intravenous Given 1/21/25 1723)   diphenhydrAMINE (Benadryl) 50 mg/mL  injection 25 mg (25 mg Intravenous Given 1/21/25 1723)   dicyclomine (Bentyl) 10 MG/ML injection 10 mg (10 mg Intramuscular Given 1/21/25 1729)   sodium chloride 0.9 % IV bolus 1,000 mL (0 mL Intravenous Stopped 1/21/25 1813)   sodium chloride 0.9 % IV bolus 1,000 mL (0 mL Intravenous Stopped 1/21/25 1915)   iopamidol (ISOVUE-370) 76 %  injection 100 mL (100 mL Intravenous Given 1/21/25 0377)     Patient was given the above medications for nausea and stomach discomfort.  He was hydrated with 2 L of normal saline in total.  He really does not feel any significant improvement in symptoms.    MDM      Patient presents with abdominal pain and diarrhea.  Differential diagnosis includes but is not limited to viral gastroenteritis, acute colitis, acute pancreatitis, dehydration and electrolyte abnormalities.  The patient does not have evidence of colitis but does appear to have an ileus on CT.  He has a normal white blood cell count but an elevated hemoglobin.  I suspect this is due to hemoconcentration as he also has an elevated creatinine of 1.82.  His sodium is low at 132 and his potassium is low at 3.4.  He is mildly hyperglycemic with a glucose of 162.  His pancreatic enzymes are normal and his pancreas appears normal on imaging.  Given the patient's lab abnormalities and ongoing symptoms in the setting of ileus, he will be admitted for further observation and treatment to Dr. Anguiano from the hospitalist service and we have discussed the case.  Stool studies have been sent as well to check for potential bacterial infections.  The patient has had C. difficile in the past but it has been many years and he does not have any current risk factors.    Disposition and Plan     Clinical Impression:  1. Paralytic ileus (HCC)    2. Acute kidney injury (HCC)    3. Diarrhea, unspecified type         History and Physical      Eleazar Das is a 44 year old male with PMHx ELVIE/ HTN/ gout/ hypogonadotropic hypogonadism/ DM who presented to the hospital for abdominal pain. His dose of Mounjaro was increased 9 days ago to help with weight loss. About 7 days ago he started to have nausea and emesis as well as watery diarrhea, 7-9 episodes daily. He developed periumbilical abdominal pain over the next few days which was bloating and cramping rated 7-9/10 with no  specific alleviating or exacerbating factors. He was able to tolerate liquids but was not eating much over this time period. He denied any fever, chills, bloody stools.      Physical Exam:    BP (!) 144/94   Pulse 102   Temp 98 °F (36.7 °C) (Oral)   Resp 20   Wt (!) 350 lb (158.8 kg)   SpO2 95%   BMI 46.18 kg/m²   General: No acute distress, Alert  Respiratory: No rhonchi, no wheezes  Cardiovascular: S1, S2. Regular rate and rhythm  Abdomen: Soft, Non-tender, non-distended, positive bowel sounds  Neuro: No new focal deficits  Extremities: trace bl pre-tibial edema     Lab 01/21/25  1653   RBC 6.58*   HGB 20.8*   HCT 55.6*   MCV 84.5   MCH 31.6   MCHC 37.4*   RDW 13.0   NEPRELIM 4.55   WBC 9.8   .0      *   BUN 17   CREATSERUM 1.82*   EGFRCR 47*   CA 10.4   ALB 4.4   *   K 3.4*      CO2 26.0   ALKPHO 87   AST 14   ALT 37   BILT 0.7   TP 7.5      Assessment & Plan:  #Paralytic Ileus  -CT showing air-fluid filled levels in multiple loops of small and large intestine concerning for paralytic ileus, mild hepatic steatosis  -may be 2/2 Mounjaro as this a known potential side effect  -NPO except meds  -LR @100 mls/h  -pain control: tylenol first then dilaudid prn breakthrough pain  -zofran prn  -monitor for improvement in symptoms     #hyponatremia  #hypokalemia  -Na corrects to 133 for hyperglycemia  -suspect multifactorial from Babar effect from hypoK and SIADH from pain  -correct K to 4  -check Mg level  -can allow Na to correct to normal over 24 hrs  -cont to monitor BMP     #ORALIA  -Cr 1.82 with BUN 17: baseline 1.04 with GFR 91  -Bun: Cr ratio <20 suggestive of pre-renal vs post-renal  -check PVR  -cont to monitor BMP  -strict I/O, avoid nephrotoxins  -IVF as above  -hold home allopurinol, losartan, HCTZ     #polycythemia  -hgb 20.8  -suspect in part from volume depletion leading to heme-concentration  -cont to trend CBC     #DM  -SSI, Q6H checks, hypoglycemia protocol  -hold home  Mounjaro     #HTN  -holding home meds as above     #gout  -holding home meds as above     #GERD  -cont home PPI      1/22:    HOSPITALIST:    Patient small amt of diarrhea o/n, none since. Hungry.      Vital signs:  Temp:  [98 °F (36.7 °C)-98.2 °F (36.8 °C)] 98 °F (36.7 °C)  Pulse:  [] 114  Resp:  [18-22] 18  BP: (132-174)/() 171/105  SpO2:  [93 %-100 %] 100 %     Physical Exam:    General: No acute distress  Respiratory: No wheezes, no rhonchi  Cardiovascular: S1, S2, regular rate and rhythm  Abdomen: Soft, Non-tender, non-distended, positive bowel sounds  Neuro: No new focal deficits.   Extremities: No edema     Lab 01/21/25  1653 01/22/25  0522   WBC 9.8 12.2*   HGB 20.8* 18.4*   MCV 84.5 83.8   .0 273.0   BAND 6  --       * 143*   BUN 17 15   CREATSERUM 1.82* 1.37*   CA 10.4 8.6*   ALB 4.4  --    * 137   K 3.4* 3.6  3.6    106   CO2 26.0 19.0*      Medications:   Scheduled Medications    amphetamine-dextroamphetamine  20 mg Oral Daily    pantoprazole  20 mg Oral QAM AC    losartan  100 mg Oral Daily    allopurinol  100 mg Oral Daily    enoxaparin  40 mg Subcutaneous Daily    insulin aspart  2-10 Units Subcutaneous q6h             lactated ringers infusion  Intravenous,   100 mL/hr,   Continuous                Assessment & Plan:  #Paralytic Ileus  -CT showing air-fluid filled levels in multiple loops of small and large intestine concerning for paralytic ileus, mild hepatic steatosis  -may be 2/2 Mounjaro as this a known potential side effect  -ADAT  -LR @100 mls/h  -zofran prn  -monitor for improvement in symptoms  -await GI stool panel     #hyponatremia  #hypokalemia  -Na corrects to 133 for hyperglycemia  -suspect multifactorial from Babar effect from hypoK and SIADH from pain  -improved     #ORALIA  -improving  -Bun: Cr ratio <20 suggestive of pre-renal  -voided  -IVF as above  -resume home meds     #polycythemia  -hgb 20.8  -suspect in part from volume depletion leading  to heme-concentration  -cont to trend CBC. Repeat as outpt     #DM  -SSI, Q6H checks, hypoglycemia protocol  -hold home Mounjaro     #HTN  -resume     #gout     #GERD  -cont home PPI       MEDICATIONS ADMINISTERED IN LAST 1 DAY:    enoxaparin (Lovenox) 80 MG/0.8ML SUBQ injection 80 mg       Date Action Dose Route User    1/22/2025 2140 Given 80 mg Subcutaneous (Right Lower Abdomen) Skyler Tolbert III, RN       losartan (Cozaar) tab 100 mg       Date Action Dose Route User    1/23/2025 0849 Given 100 mg Oral Dami Mac RN    1/22/2025 1222 Given 100 mg Oral Aliya Pimentel RN          pantoprazole (Protonix) DR tab 20 mg       Date Action Dose Route User    1/23/2025 0550 Given 20 mg Oral Skyler Tolbert III, RN            Vitals (last day)       Date/Time Temp Pulse Resp BP SpO2 Weight O2 Device O2 Flow Rate (L/min) [facility]    01/23/25 0811 97.9 °F (36.6 °C) 87 19 136/67 96 % -- None (Room air) 0 L/min     01/23/25 0357 98.3 °F (36.8 °C) 88 18 117/70 97 % -- None (Room air) --     01/23/25 0037 97.4 °F (36.3 °C) 88 18 98/45 97 % -- None (Room air) --     01/22/25 2042 98.3 °F (36.8 °C) 103 18 144/98 -- -- None (Room air) --     01/22/25 1533 98.2 °F (36.8 °C) 120 18 106/75 100 % -- None (Room air) 0 L/min     01/22/25 1123 98 °F (36.7 °C) 114 18 171/105 100 % -- None (Room air) 0 L/min     01/22/25 0750 98.2 °F (36.8 °C) 101 18 174/96 95 % -- None (Room air) 0 L/min     01/22/25 0342 98.2 °F (36.8 °C) 102 20 132/68 93 % -- None (Room air) --

## 2025-01-23 NOTE — PLAN OF CARE
Patient alert and oriented x4, vital signs stable on room air. Denies pain or nausea. Up ad taj to void. Saline locked. Tolerating soft diet. Safety measures in place, questions addressed, plan of care discussed with patient. 2 person skin check performed with Sosa GODOY, no breakdown noted.

## 2025-01-23 NOTE — PROGRESS NOTES
Alert & oriented x4.Denies pain . No nausea or vomiting.Passing flatus .Tolerated diet.Up in chair and in room.Use of I.S. and ambulation in hallway encouraged.Plan of care discussed with patient.All questions and concerns addressed.

## 2025-01-23 NOTE — PROGRESS NOTES
NURSING DISCHARGE NOTE    Discharged Home via Ambulatory.  Accompanied by RN  Belongings Taken by patient/family.    Discharge education provided. IV removed and telemetry removed. Patient declined wheelchair and left unit at 1138

## 2025-01-24 ENCOUNTER — PATIENT OUTREACH (OUTPATIENT)
Dept: CASE MANAGEMENT | Age: 45
End: 2025-01-24

## 2025-01-24 ENCOUNTER — TELEMEDICINE (OUTPATIENT)
Dept: INTERNAL MEDICINE CLINIC | Facility: CLINIC | Age: 45
End: 2025-01-24
Payer: COMMERCIAL

## 2025-01-24 DIAGNOSIS — R70.0 ELEVATED SED RATE: Primary | ICD-10-CM

## 2025-01-24 DIAGNOSIS — E87.1 HYPONATREMIA: ICD-10-CM

## 2025-01-24 DIAGNOSIS — N17.9 AKI (ACUTE KIDNEY INJURY): ICD-10-CM

## 2025-01-24 DIAGNOSIS — D58.2 ELEVATED HEMOGLOBIN: ICD-10-CM

## 2025-01-24 DIAGNOSIS — E87.6 HYPOKALEMIA: ICD-10-CM

## 2025-01-24 NOTE — PROGRESS NOTES
Left message on mailbox for patient to call nurse care manager back for transitions of care call.  Nurse care  information included in message.        A MyChart message was sent to the patient.

## 2025-01-24 NOTE — DISCHARGE SUMMARY
[facility]     Eleazar Das Patient Status:  Inpatient    1980 MRN [id number]   Location [facility] 3NW-A Attending No att. providers found   Hosp Day # 1 PCP Yasmany Ochoa MD     Date of Admission: 2025  Date of Discharge:  2025     Discharge Disposition: Home or Self Care    Discharge Diagnosis:  #Paralytic Ileus  -CT showing air-fluid filled levels in multiple loops of small and large intestine concerning for paralytic ileus, mild hepatic steatosis  -may be 2/2 Mounjaro as this a known potential side effect  -neg GI stool panel     #hyponatremia  #hypokalemia  -Na corrects to 133 for hyperglycemia  -suspect multifactorial from Babar effect from hypoK and SIADH from pain  -improved     #ORALIA  -improving  -Bun: Cr ratio <20 suggestive of pre-renal  -voided  -IVF as above  -resume home meds     #polycythemia  -hgb 20.8  -suspect in part from volume depletion leading to heme-concentration  -cont to trend CBC. Repeat as outpt     #DM  -SSI, Q6H checks, hypoglycemia protocol  -hold home Mounjaro     #HTN  -resume     #gout     #GERD  -cont home PPI    History of Present Illness: Eleazar Das is a 44 year old male with PMHx ELVIE/ HTN/ gout/ hypogonadotropic hypogonadism/ DM who presented to the hospital for abdominal pain. His dose of Mounjaro was increased 9 days ago to help with weight loss. About 7 days ago he started to have nausea and emesis as well as watery diarrhea, 7-9 episodes daily. He developed periumbilical abdominal pain over the next few days which was bloating and cramping rated 7-9/10 with no specific alleviating or exacerbating factors. He was able to tolerate liquids but was not eating much over this time period. He denied any fever, chills, bloody stools.       Brief Synopsis: pt w/ paralytic ileus, suspect 2/2 mounjaro. Slowly improved and rehydrated. Renal fxn imrpoved as well. To hold mounjaro for now. Can d/w PCP regarding resuming at  lower dose once fully improved. Would consider waiting a month. Repeat labs w/ PCP in 1 week.     Lace+ Score: 60  59-90 High Risk  29-58 Medium Risk  0-28   Low Risk       TCM Follow-Up Recommendation:  LACE > 58: High Risk of readmission after discharge from the hospital.      Procedures during hospitalization:   none    Incidental or significant findings and recommendations (brief descriptions):  none    Lab/Test results pending at Discharge:   none    Consultants:  none    Discharge Medication List:     Discharge Medications        CHANGE how you take these medications        Instructions Prescription details   hydroCHLOROthiazide 25 MG Tabs  What changed: additional instructions      Take 1 tablet (25 mg total) by mouth daily. HOLD until sees PCP   Refills: 0            CONTINUE taking these medications        Instructions Prescription details   allopurinol 100 MG Tabs  Commonly known as: Zyloprim      Take 1 tablet (100 mg total) by mouth 2 (two) times daily.   Quantity: 180 tablet  Refills: 3     amphetamine-dextroamphetamine 20 MG Tabs  Commonly known as: Adderall      Take 1 tablet (20 mg total) by mouth 2 (two) times daily.   Quantity: 60 tablet  Refills: 0     losartan 100 MG Tabs  Commonly known as: Cozaar      Take 1 tablet (100 mg total) by mouth daily.   Quantity: 90 tablet  Refills: 3     pantoprazole 20 MG Tbec  Commonly known as: Protonix      Take 1 tablet (20 mg total) by mouth every morning before breakfast.   Quantity: 90 tablet  Refills: 1            STOP taking these medications      Mounjaro 10 MG/0.5ML Soaj  Generic drug: Tirzepatide                 [place]    Follow-up appointment:   Yasmany Ochoa MD  [address] [address]  [phone]    Schedule an appointment as soon as possible for a visit      Appointments for Next 30 Days 1/23/2025 - 2/22/2025        Date Arrival Time Visit Type Length Department Provider     1/24/2025  1:40 PM  MYCHART VIDEO  FOLLOW UP [7579] 20 min [facility], [address] [place] [address], [place] Maria M Garcia PA-C    Patient Instructions:     Please verify your telehealth insurance benefits prior to your appointment.    You must be in the [place] [facility] [place] during the virtual visit.     Please use the [employer] and launch the video visit 10 minutes prior to your scheduled appointment time to ensure your camera and microphone are working properly. Once the video visit has started you will be placed in a waiting room until the provider begins the visit.     You will receive an email confirmation with instructions.  If you have questions, call your doctor's office directly.    If you are having issues or need to use a desktop/laptop, please follow the below steps:        1.       Close out all other open apps (could be competing for audio resources)  2.       Disable Bluetooth  3.       Reboot mobile device before joining the video  4.       Come off Wi-Fi and switch over to Data    Please see our Video Visit Tip Sheet if you need additional assistance.     If you believe this is an emergency, please dial 911 immediately.          Location Instructions:     Masks are optional for all patients and visitors, unless otherwise indicated.                      Vital signs:  Temp:  [97.4 °F (36.3 °C)-98.3 °F (36.8 °C)] 97.9 °F (36.6 °C)  Pulse:  [] 87  Resp:  [18-19] 19  BP: ()/(45-98) 136/67  SpO2:  [96 %-97 %] 96 %    Physical Exam:    General: No acute distress   Lungs: clear to auscultation  Cardiovascular: S1, S2  Abdomen: Soft      -----------------------------------------------------------------------------------------------  PATIENT DISCHARGE INSTRUCTIONS: See electronic chart    Dustin Duenas MD    Total time spent on discharge plannin minutes     The  Century Cures Act makes medical notes like these available to patients in the interest of transparency. Please be advised this is a medical  document. Medical documents are intended to carry relevant information, facts as evident, and the clinical opinion of the practitioner. The medical note is intended as peer to peer communication and may appear blunt or direct. It is written in medical language and may contain abbreviations or verbiage that are unfamiliar.

## 2025-01-24 NOTE — PAYOR COMM NOTE
--------------  DISCHARGE REVIEW    Payor: [facility]  Subscriber #:  [id number]  Authorization Number: [id number]    Admit date: 25  Admit time:   8:20 AM  Discharge Date: 2025 11:39 AM     Admitting Physician: Kari Coulter DO  Attending Physician:  No att. providers found  Primary Care Physician: Yasmany Ochoa MD          Discharge Summary Notes        Discharge Summary signed by Dustin Duenas MD at 2025  8:14 PM       Author: Dustin Duenas MD Specialty: HOSPITALIST, Internal Medicine Author Type: Physician    Filed: 2025  8:14 PM Date of Service: 2025  8:12 PM Status: Signed    : Dustin Duenas MD (Physician)           [facility]     Eleazar Das Patient Status:  Inpatient    1980 MRN [id number]   [facility] Attending No att. providers found   Hosp Day # 1 PCP Yasmany Ochoa MD     Date of Admission: 2025  Date of Discharge:  2025     Discharge Disposition: Home or Self Care    Discharge Diagnosis:  #Paralytic Ileus  -CT showing air-fluid filled levels in multiple loops of small and large intestine concerning for paralytic ileus, mild hepatic steatosis  -may be 2/2 Mounjaro as this a known potential side effect  -neg GI stool panel     #hyponatremia  #hypokalemia  -Na corrects to 133 for hyperglycemia  -suspect multifactorial from Babar effect from hypoK and SIADH from pain  -improved     #ORALIA  -improving  -Bun: Cr ratio <20 suggestive of pre-renal  -voided  -IVF as above  -resume home meds     #polycythemia  -hgb 20.8  -suspect in part from volume depletion leading to heme-concentration  -cont to trend CBC. Repeat as outpt     #DM  -SSI, Q6H checks, hypoglycemia protocol  -hold home Mounjaro     #HTN  -resume     #gout     #GERD  -cont home PPI    History of Present Illness: Eleazar Das is a 44 year old male with PMHx ELVIE/ HTN/ gout/ hypogonadotropic hypogonadism/ DM who presented to the hospital for  abdominal pain. His dose of Mounjaro was increased 9 days ago to help with weight loss. About 7 days ago he started to have nausea and emesis as well as watery diarrhea, 7-9 episodes daily. He developed periumbilical abdominal pain over the next few days which was bloating and cramping rated 7-9/10 with no specific alleviating or exacerbating factors. He was able to tolerate liquids but was not eating much over this time period. He denied any fever, chills, bloody stools.       Brief Synopsis: pt w/ paralytic ileus, suspect 2/2 mounjaro. Slowly improved and rehydrated. Renal fxn imrpoved as well. To hold mounjaro for now. Can d/w PCP regarding resuming at lower dose once fully improved. Would consider waiting a month. Repeat labs w/ PCP in 1 week.     Lace+ Score: 60  59-90 High Risk  29-58 Medium Risk  0-28   Low Risk       TCM Follow-Up Recommendation:  LACE > 58: High Risk of readmission after discharge from the hospital.      Procedures during hospitalization:   none    Incidental or significant findings and recommendations (brief descriptions):  none    Lab/Test results pending at Discharge:   none    Consultants:  none    Discharge Medication List:     Discharge Medications        CHANGE how you take these medications        Instructions Prescription details   hydroCHLOROthiazide 25 MG Tabs  What changed: additional instructions      Take 1 tablet (25 mg total) by mouth daily. HOLD until sees PCP   Refills: 0            CONTINUE taking these medications        Instructions Prescription details   allopurinol 100 MG Tabs  Commonly known as: Zyloprim      Take 1 tablet (100 mg total) by mouth 2 (two) times daily.   Quantity: 180 tablet  Refills: 3     amphetamine-dextroamphetamine 20 MG Tabs  Commonly known as: Adderall      Take 1 tablet (20 mg total) by mouth 2 (two) times daily.   Quantity: 60 tablet  Refills: 0     losartan 100 MG Tabs  Commonly known as: Cozaar      Take 1 tablet (100 mg total) by mouth  daily.   Quantity: 90 tablet  Refills: 3     pantoprazole 20 MG Tbec  Commonly known as: Protonix      Take 1 tablet (20 mg total) by mouth every morning before breakfast.   Quantity: 90 tablet  Refills: 1            STOP taking these medications      Mounjaro 10 MG/0.5ML Soaj  Generic drug: Tirzepatide                 [place]    Follow-up appointment:   Yasmany Ochoa MD  [address] [place] [address]  [phone]    Schedule an appointment as soon as possible for a visit      Appointments for Next 30 Days 1/23/2025 - 2/22/2025        Date Arrival Time Visit Type Length Department Provider     1/24/2025  1:40 PM  getbetter! VIDEO FOLLOW UP [2166] 20 min [facility], [address] [place] [address] Maria M Garcia PA-C    Patient Instructions:     Please verify your telehealth insurance benefits prior to your appointment.    You must be in the [facility] during the virtual visit.     Please use the [employer] Mobile Alec and launch the video visit 10 minutes prior to your scheduled appointment time to ensure your camera and microphone are working properly. Once the video visit has started you will be placed in a waiting room until the provider begins the visit.     You will receive an email confirmation with instructions.  If you have questions, call your doctor's office directly.    If you are having issues or need to use a desktop/laptop, please follow the below steps:        1.       Close out all other open apps (could be competing for audio resources)  2.       Disable Bluetooth  3.       Reboot mobile device before joining the video  4.       Come off Wi-Fi and switch over to Data    Please see our Video Visit Tip Sheet if you need additional assistance.     If you believe this is an emergency, please dial 911 immediately.          Location Instructions:     Masks are optional for all patients and visitors, unless otherwise indicated.                      Vital  signs:  Temp:  [97.4 °F (36.3 °C)-98.3 °F (36.8 °C)] 97.9 °F (36.6 °C)  Pulse:  [] 87  Resp:  [18-19] 19  BP: ()/(45-98) 136/67  SpO2:  [96 %-97 %] 96 %    Physical Exam:    General: No acute distress   Lungs: clear to auscultation  Cardiovascular: S1, S2  Abdomen: Soft      -----------------------------------------------------------------------------------------------  PATIENT DISCHARGE INSTRUCTIONS: See electronic chart    Dustin Duenas MD    Total time spent on discharge plannin minutes     The  Century Cures Act makes medical notes like these available to patients in the interest of transparency. Please be advised this is a medical document. Medical documents are intended to carry relevant information, facts as evident, and the clinical opinion of the practitioner. The medical note is intended as peer to peer communication and may appear blunt or direct. It is written in medical language and may contain abbreviations or verbiage that are unfamiliar.     Electronically signed by Dustin Duenas MD on 2025  8:14 PM         REVIEWER COMMENTS

## 2025-01-24 NOTE — PROGRESS NOTES
Transitions of Care Navigation  Discharge Date: 25  Contact Date: 2025    Transitions of Care Assessment:  NAI Initial Assessment    General:  Assessment completed with: Patient  Patient Subjective: The patient did admit to having a few episodes of loose stool and abdominal discomfort yesterday. The patient reported abdominal discomfort and diarrhea have not occurred today. The patient was able to sleep through the night last night for the first time since symptoms began.The patient has been tolerating a general diet. The patient denies any abdominal distention or nausea/vomiting. The patient denies any fatigue, dizziness/syncope, headache, loss of appetite, confusion, muscle spasms/cramps, seizures,  gait/ balance disturbances, irregular heart beat, bleeding concerns, shortness of breath, or chest pain. The patient does not monitor glucose or blood pressure readings at home.  Chief Complaint: Paralytic Ileus  hyponatremia  hypokalemia  ORALIA  polycythemia  DM  HTN  gout   GERD  Verify patient name and  with patient/ caregiver: Yes    Hospital Stay/Discharge:  Tell me what you understand of why you were in the hospital or emergency department: Mounjar paralyzed my gastroenterology tract.  Prior to leaving the hospital were your Discharge Instructions reviewed with you?: Yes  Did you receive a copy of your written Discharge Instructions?: Yes  What questions do you have about your Discharge Instructions?: More information on a low fiber/soft diet was provided via [employer] for the patient.  Do you feel better or worse since you left the hospital or emergency department?: Better    Follow - Up Appointment:  Do you have a follow-up appointment?: Yes  Date: 25  Physician: Maria M Garcia PA-C  Are there any barriers to getting to your follow-up appointment?: No    Home Health/DME:  Prior to leaving the hospital was Home Health (HH) arranged for you?: N/A  Are HH needs identified by staff during the  assessment?: No     Prior to leaving the hospital or emergency department was Durable Medical Equipment (DME), medical supplies, or infusions arranged for you?: N/A  Are DME/medical supply/infusions needs identified by staff during this assessment?: No     Medications/Diet:  Did any of your medications change, during or after your hospital stay or ED visit?: Yes  Do you have your new or updated medications?: Yes  Do you understand what your medications are for and possible side effects?: Yes  Are there any reasons that keep you from taking your medication as prescribed?: Yes (The patient reported holding all medications until seen by the primary provider's office this afternoon.)  Any concerns about medication refills?: No    Were you given a different diet per your Discharge Instructions?: Yes  Diet Type: soft diet  Are there any barriers to following that diet?: Yes (Additional information was sent to the patient through [employer].)     Questions/Concerns:  Do you have any questions or concerns that have not been discussed?: Yes (The patient would like to review medications and side effects in detail during the scheduled appointment today.)         Nursing Interventions:  Patient symptoms were assessed, education was completed for signs/symptoms to monitor, and reviewed when to contact providers versus go to the emergency department/call 911.   Reviewed all discharge instructions with a focus on diet and medication instructions.   All medications were reviewed and educated on the importance of taking the medications as directed.     The patient reported holding all medications until seen by the primary care physician office this afternoon. Nurse care manager did review discharge instructions including holding the hydrochlorothiazide and discontinue the Mounjaro. The patient will discuss this further during the scheduled appointment today.     Appointments were reviewed and stressed the importance of a close follow  up with providers. Follow up labs will be ordered per primary care physician office recommendations.     Additional information on a low fiber/soft diet was sent to the patient through [employer].    The patient verbalized understanding and will contact the office with any further questions or concerns.       Medications:  Medication Reconciliation:  I am aware of an inpatient discharge within the last 30 days.  The discharge medication list has been reconciled with the patient's current medication list and reviewed by me. See medication list for additions of new medication, and changes to current doses of medications and discontinued medications.  Current Outpatient Medications   Medication Sig Dispense Refill    hydroCHLOROthiazide 25 MG Oral Tab Take 1 tablet (25 mg total) by mouth daily. HOLD until sees PCP      pantoprazole (PROTONIX) 20 MG Oral Tab EC Take 1 tablet (20 mg total) by mouth every morning before breakfast. 90 tablet 1    amphetamine-dextroamphetamine (ADDERALL) 20 MG Oral Tab Take 1 tablet (20 mg total) by mouth 2 (two) times daily. (Patient taking differently: Take 1 tablet (20 mg total) by mouth daily.) 60 tablet 0    allopurinol 100 MG Oral Tab Take 1 tablet (100 mg total) by mouth 2 (two) times daily. (Patient taking differently: Take 1 tablet (100 mg total) by mouth daily.) 180 tablet 3    losartan 100 MG Oral Tab Take 1 tablet (100 mg total) by mouth daily. 90 tablet 3       NCM did confirm the patient has discontinued the following as directed:  Mounjaro 10 MG/0.5ML Soaj (Tirzepatide)     Nurse care manager did confirm the patient has held hydrochlorothiazide as directed.     Follow-up Appointments:  Your appointments       Date & Time Appointment Department (Center)    Jan 24, 2025 1:40 PM CST Follow Up Video Visit with Maria M Garcia PA-C [facility], [address] [place] [address] ([facility] IM/[facility])    Please verify your telehealth insurance benefits prior to your  appointment.    You must be in the [facility] during the virtual visit.     Please use the [employer] Mobile Alec and launch the video visit 10 minutes prior to your scheduled appointment time to ensure your camera and microphone are working properly. Once the video visit has started you will be placed in a waiting room until the provider begins the visit.     You will receive an email confirmation with instructions.  If you have questions, call your doctor's office directly.    If you are having issues or need to use a desktop/laptop, please follow the below steps:        1.       Close out all other open apps (could be competing for audio resources)  2.       Disable Bluetooth  3.       Reboot mobile device before joining the video  4.       Come off Wi-Fi and switch over to Data    Please see our Video Visit Tip Sheet if you need additional assistance.     If you believe this is an emergency, please dial 911 immediately.                [facility], [address] [place] [address] 75TH IM/FM [address] [id number] [phone]            [facility]  Was TCC Ordered: No      Primary Care Provider (If no TCC appointment)  Does patient already have a PCP appointment scheduled? Yes; The patient is scheduled with Maria M Garcia PA-C today, 1/24/2025.   Nurse Care Manager Confirmed PCP office TCM/NAI appointment with patient.     Recommended labs will be ordered at that time.       Specialist  Does the patient have any other follow-up appointment(s) need to be scheduled? No    Book By Date: 1/30/2025

## 2025-01-24 NOTE — PROGRESS NOTES
Eleazar Das is a 44 year old male.   HPI:    Pt presents to discuss recent labs. Sed rate mildly elevated. Remaining inflammatory/autoimmune labs normal. He continues to have various joint pains.     Recently hospitalized with multiple lab abnormalities. He has HFU next week and would like labs checked prior.     Allergies:  Allergies[1]   Current Meds:  Current Outpatient Medications   Medication Sig Dispense Refill    hydroCHLOROthiazide 25 MG Oral Tab Take 1 tablet (25 mg total) by mouth daily. HOLD until sees PCP (Patient not taking: Reported on 1/24/2025)      pantoprazole (PROTONIX) 20 MG Oral Tab EC Take 1 tablet (20 mg total) by mouth every morning before breakfast. (Patient not taking: Reported on 1/24/2025) 90 tablet 1    amphetamine-dextroamphetamine (ADDERALL) 20 MG Oral Tab Take 1 tablet (20 mg total) by mouth 2 (two) times daily. (Patient not taking: Reported on 1/24/2025) 60 tablet 0    allopurinol 100 MG Oral Tab Take 1 tablet (100 mg total) by mouth 2 (two) times daily. (Patient not taking: Reported on 1/24/2025) 180 tablet 3    losartan 100 MG Oral Tab Take 1 tablet (100 mg total) by mouth daily. (Patient not taking: Reported on 1/24/2025) 90 tablet 3        PMH:     Past Medical History:    ADHD (attention deficit hyperactivity disorder)    Back pain    Gout    Hypogonadotropic hypogonadism (HCC)    Obesity, unspecified    Obstructive apnea    DMG HST AHI 11    Unspecified essential hypertension         ROS:   GENERAL: Negative for fever, chills and fatigue. NAD.  HENT: Negative for congestion, sore throat, and ear pain.  RESPIRATORY: Negative for cough, chest tightness, shortness of breath and wheezing.    CV: Negative for chest pain, palpitations and leg swelling.   GI: Negative for nausea, vomiting, abdominal pain, diarrhea, and blood in stool.   : Negative for dysuria, hematuria and difficulty urinating.   MUSCULOSKELETAL: Negative for myalgias, back pain, joint swelling, arthralgias  and gait problem.   NEURO: Negative for dizziness, syncope, weakness, numbness, tingling and headaches.   PSYCH: The patient is not nervous/anxious. No depression.      PHYSICAL EXAM:   No vital signs or physical exam completed for this visit as visit was done via telehealth.       ASSESSMENT/ PLAN:   1. Elevated sed rate  Recheck labs   Would refer to rheum if persists   - Sed Rate, Westergren (Automated) [E]    2. Elevated hemoglobin (HCC)  - CBC W Differential W Platelet [E]  - Ferritin [E]  - Iron And Tibc [E]    3. ORALIA (acute kidney injury) (HCC)  - Comp Metabolic Panel (14) [E]    4. Hypokalemia  - Comp Metabolic Panel (14) [E]    5. Hyponatremia  - Comp Metabolic Panel (14) [E]       Health Maintenance Due   Topic Date Due    COVID-19 Vaccine (5 - 2024-25 season) 09/01/2024    Diabetes Care Dilated Eye Exam  11/16/2024    Annual Physical  11/16/2024    Diabetes Care: Foot Exam (Annual)  01/01/2025    Diabetes Care: Microalb/Creat Ratio (Annual)  01/01/2025         Pt indicates understanding and agrees to the plan.     No follow-ups on file.    BENEDICTO Shelton understands phone evaluation is not a substitute for face-to-face examination or emergency care. Patient advised to go to ER or call 911 for worsening symptoms or acute distress.     Please note that the following visit was completed using two-way, real-time interactive audio and  video communication.  This has been done in good jeannette to provide continuity of care in the best interest of the provider-patient relationship, due to the on-going public health crisis/national emergency and because of restrictions of visitation.  There are limitations of this visit as no physical exam could be performed.  Every conscious effort was taken to allow for sufficient and adequate time.  This billing visit was spent on reviewing labs, medications, radiology tests and decision making.  Appropriate medical decision-making and tests are  ordered as detailed in the plan of care above.           [1] No Known Allergies

## 2025-01-26 NOTE — PROGRESS NOTES
Physician Clarification    Additional information related to the BMI     Morbid Obesity     This note is part of the patient's medical record.

## 2025-01-30 ENCOUNTER — TELEPHONE (OUTPATIENT)
Dept: INTERNAL MEDICINE CLINIC | Facility: CLINIC | Age: 45
End: 2025-01-30

## 2025-01-30 NOTE — TELEPHONE ENCOUNTER
[facility] faxed over diabetic eye exam, eye exam has been abstracted and placed in CS bin for review.

## 2025-02-07 ENCOUNTER — LAB ENCOUNTER (OUTPATIENT)
Dept: LAB | Age: 45
End: 2025-02-07
Attending: PHYSICIAN ASSISTANT
Payer: COMMERCIAL

## 2025-02-07 ENCOUNTER — OFFICE VISIT (OUTPATIENT)
Dept: INTERNAL MEDICINE CLINIC | Facility: CLINIC | Age: 45
End: 2025-02-07
Payer: COMMERCIAL

## 2025-02-07 VITALS
HEIGHT: 73 IN | BODY MASS INDEX: 41.75 KG/M2 | SYSTOLIC BLOOD PRESSURE: 122 MMHG | WEIGHT: 315 LBS | DIASTOLIC BLOOD PRESSURE: 80 MMHG | HEART RATE: 106 BPM | RESPIRATION RATE: 16 BRPM | OXYGEN SATURATION: 96 % | TEMPERATURE: 97 F

## 2025-02-07 DIAGNOSIS — E87.6 HYPOKALEMIA: ICD-10-CM

## 2025-02-07 DIAGNOSIS — R70.0 ELEVATED SED RATE: ICD-10-CM

## 2025-02-07 DIAGNOSIS — D58.2 ELEVATED HEMOGLOBIN: ICD-10-CM

## 2025-02-07 DIAGNOSIS — I10 ESSENTIAL HYPERTENSION: ICD-10-CM

## 2025-02-07 DIAGNOSIS — Z09 HOSPITAL DISCHARGE FOLLOW-UP: Primary | ICD-10-CM

## 2025-02-07 DIAGNOSIS — E66.01 CLASS 3 SEVERE OBESITY DUE TO EXCESS CALORIES WITH SERIOUS COMORBIDITY AND BODY MASS INDEX (BMI) OF 45.0 TO 49.9 IN ADULT (HCC): ICD-10-CM

## 2025-02-07 DIAGNOSIS — N17.9 AKI (ACUTE KIDNEY INJURY): ICD-10-CM

## 2025-02-07 DIAGNOSIS — E66.813 CLASS 3 SEVERE OBESITY DUE TO EXCESS CALORIES WITH SERIOUS COMORBIDITY AND BODY MASS INDEX (BMI) OF 45.0 TO 49.9 IN ADULT (HCC): ICD-10-CM

## 2025-02-07 DIAGNOSIS — E11.9 TYPE 2 DIABETES MELLITUS WITHOUT COMPLICATION, WITHOUT LONG-TERM CURRENT USE OF INSULIN (HCC): ICD-10-CM

## 2025-02-07 DIAGNOSIS — E87.1 HYPONATREMIA: ICD-10-CM

## 2025-02-07 LAB
ALBUMIN SERPL-MCNC: 4 G/DL (ref 3.2–4.8)
ALBUMIN/GLOB SERPL: 1.3 {RATIO} (ref 1–2)
ALP LIVER SERPL-CCNC: 71 U/L
ALT SERPL-CCNC: 33 U/L
ANION GAP SERPL CALC-SCNC: 7 MMOL/L (ref 0–18)
AST SERPL-CCNC: 22 U/L (ref ?–34)
BASOPHILS # BLD AUTO: 0.05 X10(3) UL (ref 0–0.2)
BASOPHILS NFR BLD AUTO: 0.9 %
BILIRUB SERPL-MCNC: 0.7 MG/DL (ref 0.3–1.2)
BUN BLD-MCNC: 13 MG/DL (ref 9–23)
CALCIUM BLD-MCNC: 9.3 MG/DL (ref 8.7–10.6)
CHLORIDE SERPL-SCNC: 104 MMOL/L (ref 98–112)
CO2 SERPL-SCNC: 27 MMOL/L (ref 21–32)
CREAT BLD-MCNC: 0.96 MG/DL
DEPRECATED HBV CORE AB SER IA-ACNC: 158 NG/ML
EGFRCR SERPLBLD CKD-EPI 2021: 101 ML/MIN/1.73M2 (ref 60–?)
EOSINOPHIL # BLD AUTO: 0.07 X10(3) UL (ref 0–0.7)
EOSINOPHIL NFR BLD AUTO: 1.2 %
ERYTHROCYTE [DISTWIDTH] IN BLOOD BY AUTOMATED COUNT: 13.2 %
ERYTHROCYTE [SEDIMENTATION RATE] IN BLOOD: 27 MM/HR
FASTING STATUS PATIENT QL REPORTED: YES
GLOBULIN PLAS-MCNC: 3.1 G/DL (ref 2–3.5)
GLUCOSE BLD-MCNC: 131 MG/DL (ref 70–99)
HCT VFR BLD AUTO: 45.6 %
HGB BLD-MCNC: 15.5 G/DL
IMM GRANULOCYTES # BLD AUTO: 0.01 X10(3) UL (ref 0–1)
IMM GRANULOCYTES NFR BLD: 0.2 %
IRON SATN MFR SERPL: 38 %
IRON SERPL-MCNC: 120 UG/DL
LYMPHOCYTES # BLD AUTO: 2.14 X10(3) UL (ref 1–4)
LYMPHOCYTES NFR BLD AUTO: 37.8 %
MCH RBC QN AUTO: 30.9 PG (ref 26–34)
MCHC RBC AUTO-ENTMCNC: 34 G/DL (ref 31–37)
MCV RBC AUTO: 91 FL
MONOCYTES # BLD AUTO: 0.67 X10(3) UL (ref 0.1–1)
MONOCYTES NFR BLD AUTO: 11.8 %
NEUTROPHILS # BLD AUTO: 2.72 X10 (3) UL (ref 1.5–7.7)
NEUTROPHILS # BLD AUTO: 2.72 X10(3) UL (ref 1.5–7.7)
NEUTROPHILS NFR BLD AUTO: 48.1 %
OSMOLALITY SERPL CALC.SUM OF ELEC: 288 MOSM/KG (ref 275–295)
PLATELET # BLD AUTO: 229 10(3)UL (ref 150–450)
POTASSIUM SERPL-SCNC: 4 MMOL/L (ref 3.5–5.1)
PROT SERPL-MCNC: 7.1 G/DL (ref 5.7–8.2)
RBC # BLD AUTO: 5.01 X10(6)UL
SODIUM SERPL-SCNC: 138 MMOL/L (ref 136–145)
TOTAL IRON BINDING CAPACITY: 313 UG/DL (ref 250–425)
TRANSFERRIN SERPL-MCNC: 236 MG/DL (ref 215–365)
WBC # BLD AUTO: 5.7 X10(3) UL (ref 4–11)

## 2025-02-07 PROCEDURE — 3074F SYST BP LT 130 MM HG: CPT | Performed by: PHYSICIAN ASSISTANT

## 2025-02-07 PROCEDURE — 82728 ASSAY OF FERRITIN: CPT | Performed by: PHYSICIAN ASSISTANT

## 2025-02-07 PROCEDURE — 83540 ASSAY OF IRON: CPT | Performed by: PHYSICIAN ASSISTANT

## 2025-02-07 PROCEDURE — 85652 RBC SED RATE AUTOMATED: CPT | Performed by: PHYSICIAN ASSISTANT

## 2025-02-07 PROCEDURE — G2211 COMPLEX E/M VISIT ADD ON: HCPCS | Performed by: PHYSICIAN ASSISTANT

## 2025-02-07 PROCEDURE — 80053 COMPREHEN METABOLIC PANEL: CPT | Performed by: PHYSICIAN ASSISTANT

## 2025-02-07 PROCEDURE — 3008F BODY MASS INDEX DOCD: CPT | Performed by: PHYSICIAN ASSISTANT

## 2025-02-07 PROCEDURE — 85025 COMPLETE CBC W/AUTO DIFF WBC: CPT | Performed by: PHYSICIAN ASSISTANT

## 2025-02-07 PROCEDURE — 99214 OFFICE O/P EST MOD 30 MIN: CPT | Performed by: PHYSICIAN ASSISTANT

## 2025-02-07 PROCEDURE — 3079F DIAST BP 80-89 MM HG: CPT | Performed by: PHYSICIAN ASSISTANT

## 2025-02-07 PROCEDURE — 83550 IRON BINDING TEST: CPT | Performed by: PHYSICIAN ASSISTANT

## 2025-02-07 NOTE — PROGRESS NOTES
Eleazar Das is a 44 year old male.   Chief Complaint   Patient presents with    [facility]    Hospital F/U     HPI:    Pt presents for HFU.     Admitted to [facility] 1/21/25-1/23/25 due to paralytic ileus which is presumed to be due to his mounjaro use. He has been holding the mounjaro since his hospitalization. He feels back to his typical baseline - denies abd pain, having regular BMs.   Multiple lab abnormalities while admitted - low K, high Cr, low Na, high Hgb. Had repeat labs this morning (results pending).   Currently holding multiple medications including hydrochlorothiazide and allopurinol.    Allergies:  Allergies[1]   Current Meds:  Current Outpatient Medications   Medication Sig Dispense Refill    pantoprazole (PROTONIX) 20 MG Oral Tab EC Take 1 tablet (20 mg total) by mouth every morning before breakfast. 90 tablet 1    amphetamine-dextroamphetamine (ADDERALL) 20 MG Oral Tab Take 1 tablet (20 mg total) by mouth 2 (two) times daily. 60 tablet 0    losartan 100 MG Oral Tab Take 1 tablet (100 mg total) by mouth daily. 90 tablet 3    hydroCHLOROthiazide 25 MG Oral Tab Take 1 tablet (25 mg total) by mouth daily. HOLD until sees PCP (Patient not taking: Reported on 1/24/2025)      allopurinol 100 MG Oral Tab Take 1 tablet (100 mg total) by mouth 2 (two) times daily. (Patient not taking: Reported on 1/24/2025) 180 tablet 3        PMH:     Past Medical History:    ADHD (attention deficit hyperactivity disorder)    Back pain    Calculus of kidney    Diabetes (HCC)    Esophageal reflux    Gout    Hypogonadotropic hypogonadism (HCC)    Obesity, unspecified    Obstructive apnea    DMG HST AHI 11    Sleep apnea    Unspecified essential hypertension       ROS:   GENERAL: Negative for fever, chills and fatigue. NAD.  HENT: Negative for congestion, sore throat, and ear pain.  RESPIRATORY: Negative for cough, chest tightness, shortness of breath and wheezing.    CV: Negative for chest pain, palpitations and leg  swelling.   GI: Negative for nausea, vomiting, abdominal pain, diarrhea, and blood in stool.   : Negative for dysuria, hematuria and difficulty urinating.   MUSCULOSKELETAL: Negative for myalgias, back pain, joint swelling, arthralgias and gait problem.   NEURO: Negative for dizziness, syncope, weakness, numbness, tingling and headaches.   PSYCH: The patient is not nervous/anxious. No depression.      PHYSICAL EXAM:    /80   Pulse 106   Temp 96.7 °F (35.9 °C) (Temporal)   Resp 16   Ht 6' 1\" (1.854 m)   Wt (!) 366 lb (166 kg)   SpO2 96%   BMI 48.29 kg/m²     GENERAL: NAD. A&Ox3  RESPIRATORY: CTAB, no R/R/W  CV: RRR, no murmurs.   ABD: Soft, non-tender, non-distended. +bowel sounds. No rebound tenderness.   PSYCH: Appropriate mood and affect.      ASSESSMENT/ PLAN:   1. Hospital discharge follow-up  Records reviewed     2. Elevated sed rate  Await repeat labs - if remains elevated then would refer to rheum     3. Elevated hemoglobin  Await repeat labs     4. ORALIA (acute kidney injury)  Await repeat labs     5. Hypokalemia  Await repeat labs     6. Hyponatremia  Await repeat labs     7. Type 2 diabetes mellitus without complication, without long-term current use of insulin (HCC)  Continue to hold mounjaro - may not be able to restart GLP-1s given severity of side effects     8. Class 3 severe obesity due to excess calories with serious comorbidity and body mass index (BMI) of 45.0 to 49.9 in adult (HCC)  Some weight gain since stopping mounjaro   Reviewed lifestyle management     9. Essential hypertension  Stable with losartan   Check labs   Continue to hold hydrochlorothiazide due to multiple electrolyte abnormalities        Health Maintenance Due   Topic Date Due    COVID-19 Vaccine (5 - 2024-25 season) 09/01/2024    Annual Physical  11/16/2024    Diabetes Care: Foot Exam (Annual)  01/01/2025    Diabetes Care: Microalb/Creat Ratio (Annual)  01/01/2025           Pt indicates understanding and agrees to  the plan.     No follow-ups on file.    Maria M Garcia PA-C         [1] No Known Allergies

## 2025-02-15 NOTE — TELEPHONE ENCOUNTER
Forms reviewed by provider, sent to scan.     Show Applicator Variable?: Yes Detail Level: Detailed Post-Care Instructions: I reviewed with the patient in detail post-care instructions. Patient is to wear sunprotection, and avoid picking at any of the treated lesions. Pt may apply Vaseline to crusted or scabbing areas. I have advised Pt to return to the office in 4 weeks if lesions persist. Duration Of Freeze Thaw-Cycle (Seconds): 0 Render Note In Bullet Format When Appropriate: No Application Tool (Optional): Liquid Nitrogen Sprayer Consent: The patient's consent was obtained including but not limited to risks of crusting, scabbing, blistering, scarring, darker or lighter pigmentary change, recurrence, incomplete removal and infection.

## 2025-03-18 ENCOUNTER — PATIENT MESSAGE (OUTPATIENT)
Dept: INTERNAL MEDICINE CLINIC | Facility: CLINIC | Age: 45
End: 2025-03-18

## 2025-03-18 ENCOUNTER — OFFICE VISIT (OUTPATIENT)
Dept: FAMILY MEDICINE CLINIC | Facility: CLINIC | Age: 45
End: 2025-03-18
Payer: COMMERCIAL

## 2025-03-18 VITALS
RESPIRATION RATE: 18 BRPM | BODY MASS INDEX: 41.75 KG/M2 | WEIGHT: 315 LBS | TEMPERATURE: 98 F | DIASTOLIC BLOOD PRESSURE: 82 MMHG | OXYGEN SATURATION: 98 % | HEART RATE: 101 BPM | SYSTOLIC BLOOD PRESSURE: 150 MMHG | HEIGHT: 73 IN

## 2025-03-18 DIAGNOSIS — J40 BRONCHITIS: Primary | ICD-10-CM

## 2025-03-18 PROCEDURE — 3079F DIAST BP 80-89 MM HG: CPT | Performed by: NURSE PRACTITIONER

## 2025-03-18 PROCEDURE — 3077F SYST BP >= 140 MM HG: CPT | Performed by: NURSE PRACTITIONER

## 2025-03-18 PROCEDURE — 99213 OFFICE O/P EST LOW 20 MIN: CPT | Performed by: NURSE PRACTITIONER

## 2025-03-18 PROCEDURE — 3008F BODY MASS INDEX DOCD: CPT | Performed by: NURSE PRACTITIONER

## 2025-03-18 RX ORDER — ALBUTEROL SULFATE 90 UG/1
2 INHALANT RESPIRATORY (INHALATION) EVERY 4 HOURS PRN
Qty: 1 EACH | Refills: 0 | Status: SHIPPED | OUTPATIENT
Start: 2025-03-18 | End: 2025-04-01

## 2025-03-18 NOTE — PROGRESS NOTES
CHIEF COMPLAINT:     Chief Complaint   Patient presents with    Cough     Traveled to [place] last week, cough since 3/11 has worsened, chest congestion, diarrhea  Denies runny nose, fever, chills  No recent Covid test   OTC Mucinex, Pepto, Ibuprofen          HPI:   Eleazar Das is a 44 year old male who presents for cough for  1  weeks.  Cough started gradually and is described as persistent and dry. Patient reports history of bronchitis. Cough is not productive and is worse at night.  Home treatments include: Mucinex, Pepto, ibuprofen.   Associated symptoms include:  mild congestion and diarrhea.  Reports he was having about 4 episodes of diarrhea a day, but it has lessened each day.  Denies abdominal pain.  Has had issues with diarrhea and electrolyte imbalance in the past.  Hx of ileus about 2 months ago, but reports this does not feel similar to that episode.  Patient was in [place] last week. .  Denies n/v/fevers.        Current Outpatient Medications   Medication Sig Dispense Refill    albuterol 108 (90 Base) MCG/ACT Inhalation Aero Soln Inhale 2 puffs into the lungs every 4 (four) hours as needed for Wheezing or Shortness of Breath. 1 each 0    hydroCHLOROthiazide 25 MG Oral Tab Take 1 tablet (25 mg total) by mouth daily. HOLD until sees PCP (Patient not taking: Reported on 1/24/2025)      pantoprazole (PROTONIX) 20 MG Oral Tab EC Take 1 tablet (20 mg total) by mouth every morning before breakfast. 90 tablet 1    amphetamine-dextroamphetamine (ADDERALL) 20 MG Oral Tab Take 1 tablet (20 mg total) by mouth 2 (two) times daily. 60 tablet 0    allopurinol 100 MG Oral Tab Take 1 tablet (100 mg total) by mouth 2 (two) times daily. (Patient not taking: Reported on 1/24/2025) 180 tablet 3    losartan 100 MG Oral Tab Take 1 tablet (100 mg total) by mouth daily. 90 tablet 3      Past Medical History:    ADHD (attention deficit hyperactivity disorder)    Back pain    Calculus of kidney    Diabetes (HCC)    Esophageal  reflux    Gout    Hypogonadotropic hypogonadism (HCC)    Obesity, unspecified    Obstructive apnea    DMG HST AHI 11    Sleep apnea    Unspecified essential hypertension      Social History:  Social History     Socioeconomic History    Marital status:    Tobacco Use    Smoking status: Former     Current packs/day: 0.00     Types: Cigarettes     Quit date: 2006     Years since quittin.3    Smokeless tobacco: Never   Vaping Use    Vaping status: Never Used   Substance and Sexual Activity    Alcohol use: Yes     Comment: rare    Drug use: No    Sexual activity: Yes     Partners: Female   Other Topics Concern     Service No    Blood Transfusions No    Caffeine Concern Yes    Hobby Hazards Yes    Stress Concern No    Weight Concern Yes    Special Diet No    Exercise No    Seat Belt Yes     Social Drivers of Health     Food Insecurity: No Food Insecurity (2025)    Food Insecurity     Food Insecurity: Never true   Transportation Needs: No Transportation Needs (2025)    Transportation Needs     Lack of Transportation: No   Housing Stability: Low Risk  (2025)    Housing Stability     Housing Instability: No        REVIEW OF SYSTEMS:   GENERAL: see HPI  SKIN: No rashes, or other skin lesions.   EYES: Denies blurred vision or double vision.  HENT:  See HPI  CARDIOVASCULAR: Denies palpitations  LUNGS: Per HPI.   GI: Denies N/V/C/D or abdominal pain.      EXAM:   /82   Pulse 101   Temp 98.2 °F (36.8 °C) (Oral)   Resp 18   Ht 6' 1\" (1.854 m)   Wt (!) 361 lb (163.7 kg)   SpO2 98%   BMI 47.63 kg/m²   GENERAL: well developed, well nourished,in no apparent distress  SKIN: no rashes, no suspicious lesions  EYES: Conjunctiva clear.  No scleral icterus.  HENT: oral mucosa moist.  Atraumatic, normocephalic.  TM's clear bilaterally.  Nostrils patent, nasal mucosa pink and non-inflamed.  No erythema of the throat.  NECK: supple, non-tender.  LUNGS: Normal respiratory rate. Normal effort.   dry cough. no wheezing. No rales or crackles. No decreased BS.  Normal egophony.  CARDIO: RRR without murmur  GI:  BS X 4, normoactive.  No tenderness in all quadrants.   LYMPH: No cervical or supraclavicular lymphadenopathy.   EXTREMITIES:  No clubbing, cyanosis, or edema.    ASSESSMENT AND PLAN:   Eleazar Das is a 44 year old male who presents with:     ASSESSMENT:  Encounter Diagnosis   Name Primary?    Bronchitis Yes       PLAN:  Meds as below.  Advised continuing Pedialyte and bland diet.  Continue staying hydrated.  Follow up if diarrhea does not continue to improve.  Discussed limitations of [facility] and unable to check labs.  Patient will reach out to PCP to see if follow up labs could be ordered.  Comfort Care as listed in Patient Instructions.      Meds & Refills for this Visit:  Requested Prescriptions     Signed Prescriptions Disp Refills    albuterol 108 (90 Base) MCG/ACT Inhalation Aero Soln 1 each 0     Sig: Inhale 2 puffs into the lungs every 4 (four) hours as needed for Wheezing or Shortness of Breath.     Side effects, risks, benefits, of medication explained and discussed.    The patient indicates understanding of these issues and agrees to the plan.  The patient is asked to see PCP if sx's persist for more than 2 weeks, sooner if worsen.    Patient Instructions   Continue Pedialyte, follow up with PCP for possible labs  Use inhaler every 4-6 hours  Follow up for new or worsening symptoms

## 2025-03-18 NOTE — PATIENT INSTRUCTIONS
Continue Pedialyte, follow up with PCP for possible labs  Use inhaler every 4-6 hours  Follow up for new or worsening symptoms

## 2025-03-19 NOTE — TELEPHONE ENCOUNTER
Attempted to reach pt, unable to leave detailed VM on unidentified mailbox.  Asked pt to callback office to discuss symptoms with a nurse.

## 2025-03-20 NOTE — TELEPHONE ENCOUNTER
Patient returned call. He went to [facility] 2 days ago for cough and diarrhea after trip to [place]. He is using Albuterol inhaler with some relief. He is taking mucinex DM, pedialyte, sticking to a bland diet and drinking plenty of fluids. Has about 5 episodes of diarrhea daily. Denies any sick travel companions. Follow up appointment scheduled tomorrow. Advised patient to continue to push clear fluids, BRAT diet, albuterol as directed.

## 2025-03-21 ENCOUNTER — OFFICE VISIT (OUTPATIENT)
Dept: INTERNAL MEDICINE CLINIC | Facility: CLINIC | Age: 45
End: 2025-03-21
Payer: COMMERCIAL

## 2025-03-21 VITALS
OXYGEN SATURATION: 97 % | WEIGHT: 315 LBS | SYSTOLIC BLOOD PRESSURE: 124 MMHG | BODY MASS INDEX: 41.75 KG/M2 | HEIGHT: 73 IN | HEART RATE: 80 BPM | DIASTOLIC BLOOD PRESSURE: 72 MMHG | RESPIRATION RATE: 20 BRPM | TEMPERATURE: 97 F

## 2025-03-21 DIAGNOSIS — J40 BRONCHITIS: Primary | ICD-10-CM

## 2025-03-21 PROCEDURE — 99213 OFFICE O/P EST LOW 20 MIN: CPT | Performed by: PHYSICIAN ASSISTANT

## 2025-03-21 PROCEDURE — G2211 COMPLEX E/M VISIT ADD ON: HCPCS | Performed by: PHYSICIAN ASSISTANT

## 2025-03-21 PROCEDURE — 3078F DIAST BP <80 MM HG: CPT | Performed by: PHYSICIAN ASSISTANT

## 2025-03-21 PROCEDURE — 3008F BODY MASS INDEX DOCD: CPT | Performed by: PHYSICIAN ASSISTANT

## 2025-03-21 PROCEDURE — 3074F SYST BP LT 130 MM HG: CPT | Performed by: PHYSICIAN ASSISTANT

## 2025-03-21 RX ORDER — PREDNISONE 20 MG/1
40 TABLET ORAL DAILY
Qty: 10 TABLET | Refills: 0 | Status: SHIPPED | OUTPATIENT
Start: 2025-03-21 | End: 2025-03-26

## 2025-03-21 NOTE — PROGRESS NOTES
Eleazar Das is a 44 year old male.   Chief Complaint   Patient presents with    Follow - Up     EJ Rm 10- Pt is here f/u on bronchitis and diarrhea, pt state the diarrhea has cleared up but the bronchitis has not gotten any better the inhaler works a short period of time     HPI:    Pt presents for UC f/u.   Developed a cough 10 days ago while traveling in [place] for work. Cough worsened after he returned home.   Seen at [facility] on 3/18 and diagnosed with bronchitis. Using albuterol prn with temporary relief but still with chest congestion and coughing fits.   Denies fever and chills. Denies SOB.   +Hoarse voice.     Allergies:  Allergies[1]   Current Meds:  Current Outpatient Medications   Medication Sig Dispense Refill    predniSONE 20 MG Oral Tab Take 2 tablets (40 mg total) by mouth daily for 5 days. 10 tablet 0    albuterol 108 (90 Base) MCG/ACT Inhalation Aero Soln Inhale 2 puffs into the lungs every 4 (four) hours as needed for Wheezing or Shortness of Breath. 1 each 0    pantoprazole (PROTONIX) 20 MG Oral Tab EC Take 1 tablet (20 mg total) by mouth every morning before breakfast. 90 tablet 1    amphetamine-dextroamphetamine (ADDERALL) 20 MG Oral Tab Take 1 tablet (20 mg total) by mouth 2 (two) times daily. 60 tablet 0    losartan 100 MG Oral Tab Take 1 tablet (100 mg total) by mouth daily. 90 tablet 3    hydroCHLOROthiazide 25 MG Oral Tab Take 1 tablet (25 mg total) by mouth daily. HOLD until sees PCP (Patient not taking: Reported on 3/21/2025)      allopurinol 100 MG Oral Tab Take 1 tablet (100 mg total) by mouth 2 (two) times daily. (Patient not taking: Reported on 3/21/2025) 180 tablet 3        PMH:     Past Medical History:    ADHD (attention deficit hyperactivity disorder)    Back pain    Calculus of kidney    Diabetes (HCC)    Esophageal reflux    Gout    Hypogonadotropic hypogonadism (HCC)    Obesity, unspecified    Obstructive apnea    DMG HST AHI 11    Sleep apnea    Unspecified essential  hypertension       ROS:   Review of Systems   Constitutional:  Positive for fatigue. Negative for chills and fever.   HENT:  Positive for voice change. Negative for congestion, postnasal drip, rhinorrhea, sinus pressure, sinus pain and sore throat.    Respiratory:  Positive for cough. Negative for shortness of breath and wheezing.    Cardiovascular:  Negative for chest pain.   Neurological:  Negative for dizziness, light-headedness and headaches.       PHYSICAL EXAM:    /72   Pulse 80   Temp 96.7 °F (35.9 °C) (Temporal)   Resp 20   Ht 6' 1\" (1.854 m)   Wt (!) 366 lb (166 kg)   SpO2 97%   BMI 48.29 kg/m²     GENERAL: NAD. A&Ox3  HEENT: Ear canals clear, TMs pearly gray bilaterally. Throat without erythema or exudates.  NECK: No LAD.   RESPIRATORY: CTAB, no R/R/W  CV: RRR, no murmurs.   PSYCH: Appropriate mood and affect.      ASSESSMENT/ PLAN:   1. Bronchitis  Reviewed supportive care  Add prednisone x 5 days   Continue albuterol prn   If not improving by early next week then would check CXR   Call sooner or return to the  if worse        Health Maintenance Due   Topic Date Due    COVID-19 Vaccine (5 - 2024-25 season) 09/01/2024    Annual Physical  11/16/2024    Diabetes Care: Foot Exam (Annual)  01/01/2025    Diabetes Care: Microalb/Creat Ratio (Annual)  01/01/2025    HTN: BP Follow-Up  04/18/2025           Pt indicates understanding and agrees to the plan.     No follow-ups on file.    Maria M Garcia PA-C           [1] No Known Allergies

## 2025-03-24 DIAGNOSIS — F90.9 ATTENTION DEFICIT HYPERACTIVITY DISORDER (ADHD), UNSPECIFIED ADHD TYPE: ICD-10-CM

## 2025-03-28 RX ORDER — DEXTROAMPHETAMINE SACCHARATE, AMPHETAMINE ASPARTATE, DEXTROAMPHETAMINE SULFATE AND AMPHETAMINE SULFATE 5; 5; 5; 5 MG/1; MG/1; MG/1; MG/1
20 TABLET ORAL 2 TIMES DAILY
Qty: 60 TABLET | Refills: 0 | Status: SHIPPED | OUTPATIENT
Start: 2025-03-28

## 2025-03-28 NOTE — TELEPHONE ENCOUNTER
Please review. Refill failed protocol.     Recent fills each # 60 : 12/20/2024 , 11/05/2024  Last prescription written: 12/20/2024  Last office visit:  3/21/2025    No future appointments.

## 2025-04-12 ENCOUNTER — HOSPITAL ENCOUNTER (OUTPATIENT)
Dept: GENERAL RADIOLOGY | Age: 45
Discharge: HOME | End: 2025-04-12
Attending: INTERNAL MEDICINE
Payer: COMMERCIAL

## 2025-04-12 ENCOUNTER — PATIENT MESSAGE (OUTPATIENT)
Dept: INTERNAL MEDICINE CLINIC | Facility: CLINIC | Age: 45
End: 2025-04-12

## 2025-04-12 DIAGNOSIS — R06.9 BREATHING PROBLEM: ICD-10-CM

## 2025-04-12 PROCEDURE — 71046 X-RAY EXAM CHEST 2 VIEWS: CPT | Performed by: INTERNAL MEDICINE

## 2025-04-14 NOTE — TELEPHONE ENCOUNTER
CS- Pt asking what to do then for ongoing breathing issues.     LOV 3/21/25   Xray completed 4/12/25     Normal results  Please call or my chart if any questions.   Written by Nel Garnica MD on 4/12/2025  2:37 PM CDT  Seen by patient Eleazar Das on 4/12/2025  4:01 PM

## 2025-04-28 ENCOUNTER — TELEPHONE (OUTPATIENT)
Dept: INTERNAL MEDICINE CLINIC | Facility: CLINIC | Age: 45
End: 2025-04-28

## 2025-04-28 NOTE — TELEPHONE ENCOUNTER
[employer] [facility] faxed an achieving glycemic control in people with diabetes form to be completed and fax back to the number at [phone]. Placed in CS bin to completed.

## 2025-05-30 RX ORDER — PANTOPRAZOLE SODIUM 20 MG/1
20 TABLET, DELAYED RELEASE ORAL
Qty: 90 TABLET | Refills: 3 | Status: SHIPPED | OUTPATIENT
Start: 2025-05-30

## 2025-06-03 DIAGNOSIS — F90.9 ATTENTION DEFICIT HYPERACTIVITY DISORDER (ADHD), UNSPECIFIED ADHD TYPE: ICD-10-CM

## 2025-06-03 NOTE — TELEPHONE ENCOUNTER
Please review. Refill failed protocol.     Recent fills each # 60 : 3/28/25 , 12/20/24  Last prescription written: 3/28/25  Last office visit:  Visit date not found    No future appointments.     Sent [employer] message to patient the refill request was forwarded to provider.

## 2025-06-04 RX ORDER — DEXTROAMPHETAMINE SACCHARATE, AMPHETAMINE ASPARTATE, DEXTROAMPHETAMINE SULFATE AND AMPHETAMINE SULFATE 5; 5; 5; 5 MG/1; MG/1; MG/1; MG/1
20 TABLET ORAL 2 TIMES DAILY
Qty: 60 TABLET | Refills: 0 | Status: SHIPPED | OUTPATIENT
Start: 2025-06-04

## 2025-06-16 DIAGNOSIS — E11.9 TYPE 2 DIABETES MELLITUS WITHOUT COMPLICATION, WITHOUT LONG-TERM CURRENT USE OF INSULIN (HCC): Primary | ICD-10-CM

## 2025-06-17 NOTE — TELEPHONE ENCOUNTER
Please review:   - Is patient okay to resume hydrochlorothiazide? Per 2/7/25 lab results abnormal labs from hospital all back to normal  - Losartan 100 mg last ordered by Dr. Ochoa.    No future appointments with primary care medicine   Last office visit: 3/21/25

## 2025-06-17 NOTE — TELEPHONE ENCOUNTER
Per 2/7/25 lab results:  Written by Maria M Garcia PA-C on 2/8/2025  8:07 AM CST   Your abnormal labs from the hospital all look better. Your hemoglobin, kidney function, and electrolytes are all back in the normal ranges.     Lillian    Per 2/7/25 office visit with Maria M Garcia:  Currently holding multiple medications including hydrochlorothiazide and allopurinol.     Essential hypertension  Stable with losartan   Check labs   Continue to hold hydrochlorothiazide due to multiple electrolyte abnormalities

## 2025-06-19 RX ORDER — HYDROCHLOROTHIAZIDE 25 MG/1
25 TABLET ORAL DAILY
Qty: 90 TABLET | Refills: 3 | Status: SHIPPED | OUTPATIENT
Start: 2025-06-19

## 2025-06-19 RX ORDER — LOSARTAN POTASSIUM 100 MG/1
100 TABLET ORAL DAILY
Qty: 90 TABLET | Refills: 3 | Status: SHIPPED | OUTPATIENT
Start: 2025-06-19

## 2025-07-22 DIAGNOSIS — F90.9 ATTENTION DEFICIT HYPERACTIVITY DISORDER (ADHD), UNSPECIFIED ADHD TYPE: ICD-10-CM

## 2025-07-24 NOTE — TELEPHONE ENCOUNTER
Please review; protocol failed/ has no protocol      Recent fills: 06/04/2025,03/28/2025  Last Rx written: 06/04/2025  Last Office Visit: 03/21/2025    Recent Visits  Date Type Provider Dept   03/21/25 Office Visit Maria M Garcia PA-C [facility] [address]

## 2025-07-25 RX ORDER — DEXTROAMPHETAMINE SACCHARATE, AMPHETAMINE ASPARTATE, DEXTROAMPHETAMINE SULFATE AND AMPHETAMINE SULFATE 5; 5; 5; 5 MG/1; MG/1; MG/1; MG/1
20 TABLET ORAL 2 TIMES DAILY
Qty: 60 TABLET | Refills: 0 | Status: SHIPPED | OUTPATIENT
Start: 2025-07-25

## (undated) NOTE — Clinical Note
Initial assessment completed with patient.  Appointment scheduled for 1/24/2025. The patient has held all medications until seen/discussed during the scheduled visit today. Follow up labs were also recommended after hospital discharge.  Thank you!

## (undated) NOTE — ED AVS SNAPSHOT
Marc Quinonez   MRN: [id number]    Department:  [address] [facility] in [place]   Date of Visit:  2/1/2020           Disclosure     Insurance plans vary and the physician(s) referred by the ER may not be covered by your plan.  Please contac tell this physician (or your personal doctor if your instructions are to return to your personal doctor) about any new or lasting problems. The primary care or specialist physician will see patients referred from the [facility] Emergency Department.  Vickie Greene